# Patient Record
Sex: FEMALE | Race: WHITE | NOT HISPANIC OR LATINO | Employment: UNEMPLOYED | URBAN - METROPOLITAN AREA
[De-identification: names, ages, dates, MRNs, and addresses within clinical notes are randomized per-mention and may not be internally consistent; named-entity substitution may affect disease eponyms.]

---

## 2023-11-03 ENCOUNTER — OFFICE VISIT (OUTPATIENT)
Dept: MEDICAL GROUP | Facility: MEDICAL CENTER | Age: 33
End: 2023-11-03
Attending: FAMILY MEDICINE
Payer: MEDICAID

## 2023-11-03 VITALS
HEART RATE: 98 BPM | DIASTOLIC BLOOD PRESSURE: 78 MMHG | OXYGEN SATURATION: 98 % | WEIGHT: 199 LBS | SYSTOLIC BLOOD PRESSURE: 118 MMHG | RESPIRATION RATE: 16 BRPM | TEMPERATURE: 97.9 F | BODY MASS INDEX: 35.26 KG/M2 | HEIGHT: 63 IN

## 2023-11-03 DIAGNOSIS — F32.A ANXIETY AND DEPRESSION: ICD-10-CM

## 2023-11-03 DIAGNOSIS — F60.3 BORDERLINE PERSONALITY DISORDER (HCC): ICD-10-CM

## 2023-11-03 DIAGNOSIS — F43.10 PTSD (POST-TRAUMATIC STRESS DISORDER): ICD-10-CM

## 2023-11-03 DIAGNOSIS — F41.9 ANXIETY AND DEPRESSION: ICD-10-CM

## 2023-11-03 DIAGNOSIS — F41.0 SEVERE ANXIETY WITH PANIC: ICD-10-CM

## 2023-11-03 DIAGNOSIS — E28.2 PCOS (POLYCYSTIC OVARIAN SYNDROME): ICD-10-CM

## 2023-11-03 PROBLEM — F41.1 GAD (GENERALIZED ANXIETY DISORDER): Status: ACTIVE | Noted: 2023-11-03

## 2023-11-03 PROCEDURE — 3074F SYST BP LT 130 MM HG: CPT | Performed by: FAMILY MEDICINE

## 2023-11-03 PROCEDURE — 99204 OFFICE O/P NEW MOD 45 MIN: CPT | Performed by: FAMILY MEDICINE

## 2023-11-03 PROCEDURE — 3078F DIAST BP <80 MM HG: CPT | Performed by: FAMILY MEDICINE

## 2023-11-03 RX ORDER — SPIRONOLACTONE 100 MG/1
100 TABLET, FILM COATED ORAL DAILY
Qty: 90 TABLET | Refills: 2 | Status: SHIPPED | OUTPATIENT
Start: 2023-11-03 | End: 2023-12-26 | Stop reason: SDUPTHER

## 2023-11-03 RX ORDER — AMITRIPTYLINE HYDROCHLORIDE 100 MG/1
200 TABLET ORAL NIGHTLY
Qty: 60 TABLET | Refills: 2 | Status: SHIPPED | OUTPATIENT
Start: 2023-11-03 | End: 2023-12-26 | Stop reason: SDUPTHER

## 2023-11-03 RX ORDER — LORAZEPAM 1 MG/1
0.5 TABLET ORAL
COMMUNITY

## 2023-11-03 RX ORDER — LURASIDONE HYDROCHLORIDE 80 MG/1
TABLET, FILM COATED ORAL
Qty: 180 TABLET | Refills: 0 | Status: SHIPPED | OUTPATIENT
Start: 2023-11-03 | End: 2023-11-07 | Stop reason: SDUPTHER

## 2023-11-03 RX ORDER — PROPRANOLOL HYDROCHLORIDE 40 MG/1
40 TABLET ORAL 2 TIMES DAILY
Qty: 60 TABLET | Refills: 2 | Status: SHIPPED | OUTPATIENT
Start: 2023-11-03 | End: 2023-12-11 | Stop reason: SDUPTHER

## 2023-11-03 RX ORDER — LURASIDONE HYDROCHLORIDE 80 MG/1
80 TABLET, FILM COATED ORAL
COMMUNITY
End: 2023-11-03 | Stop reason: SDUPTHER

## 2023-11-03 RX ORDER — AMITRIPTYLINE HYDROCHLORIDE 100 MG/1
200 TABLET ORAL NIGHTLY
COMMUNITY
End: 2023-11-03 | Stop reason: SDUPTHER

## 2023-11-03 RX ORDER — SPIRONOLACTONE 100 MG/1
100 TABLET, FILM COATED ORAL DAILY
COMMUNITY
End: 2023-11-03 | Stop reason: SDUPTHER

## 2023-11-03 RX ORDER — PROPRANOLOL HYDROCHLORIDE 40 MG/1
40 TABLET ORAL 2 TIMES DAILY
COMMUNITY
End: 2023-11-03 | Stop reason: SDUPTHER

## 2023-11-03 RX ORDER — HYDROXYZINE PAMOATE 25 MG/1
25 CAPSULE ORAL 3 TIMES DAILY PRN
Qty: 90 CAPSULE | Refills: 1 | Status: SHIPPED | OUTPATIENT
Start: 2023-11-03 | End: 2023-12-26 | Stop reason: SDUPTHER

## 2023-11-03 NOTE — LETTER
[facility]  Tiny Gunn M.D.  [address]  [place] [address]  Fax: [phone]   Authorization for Release/Disclosure of   Protected Health Information   Name: JANE AGUILERA GALE : 1990 SSN: [id number]   Address: [address] [place] [id number] Phone:    There are no phone numbers on file.   I authorize the entity listed below to release/disclose the PHI below to:   [facility]/Tiny Gunn M.D. and Tiny Gunn M.D.   Provider or Entity Name:  Priyanka  [facility]     Address   [facility], [place]  [address] [facility]  Phone:      Fax:     Reason for request: continuity of care   Information to be released:    [  ] LAST COLONOSCOPY,  including any PATH REPORT and follow-up  [  ] LAST FIT/COLOGUARD RESULT [  ] LAST DEXA  [  ] LAST MAMMOGRAM  [  ] LAST PAP  [  ] LAST LABS [  ] RETINA EXAM REPORT  [  ] IMMUNIZATION RECORDS  [  ] Release all info      [  ] Check here and initial the line next to each item to release ALL health information INCLUDING  _____ Care and treatment for drug and / or alcohol abuse  _____ HIV testing, infection status, or AIDS  _____ Genetic Testing    DATES OF SERVICE OR TIME PERIOD TO BE DISCLOSED: _____________  I understand and acknowledge that:  * This Authorization may be revoked at any time by you in writing, except if your health information has already been used or disclosed.  * Your health information that will be used or disclosed as a result of you signing this authorization could be re-disclosed by the recipient. If this occurs, your re-disclosed health information may no longer be protected by State or Federal laws.  * You may refuse to sign this Authorization. Your refusal will not affect your ability to obtain treatment.  * This Authorization becomes effective upon signing and will  on (date) __________.      If no date is indicated, this Authorization will  one (1) year from the signature date.    Name: Jane AGUILERA  Ethan  Signature: Date:   11/3/2023     PLEASE FAX REQUESTED RECORDS BACK TO: [phone]

## 2023-11-03 NOTE — PROGRESS NOTES
Subjective:     CC:  No chief complaint on file.      HISTORY OF THE PRESENT ILLNESS: Patient is a 33 y.o. female. This pleasant patient is here today with her partner Isidoro, to establish primary care with me and discuss the following issues.   Her prior PCP was Priyanka Terrell DO ; last seen 7/2023  Relocated to [place] 8/2023    Specialists   None current; previously under care of Psychiatry and Therapy    She is currently without any acute complaints and here primarily for medication refill.  Her main medical conditions involve various psychiatric diagnoses as noted below.  She reports previously being under the care of psychiatry and therapy however was lost to follow-up after she relocated from the [facility] and was unable to reestablish with psychiatry and therapy.  Her medications have been Primax Khadijah manage by her former PCP in [place].  She states that she takes Ativan as needed for severe panic attacks.  Tends to take them often due to a severe fear of driving on the highway.  She also uses marijuana on a regular basis to help with mood and sleep.  She reports compliance with her other medications as noted below.    Allergies: Sulfa drugs      [id number] - [place], [place] - [address] [address] [address] [facility] [address]  Phone: [phone] Fax: [phone]      Current Outpatient Medications   Medication Sig Dispense Refill    LORazepam (ATIVAN) 1 MG Tab Take 0.5 mg by mouth 1 time a day as needed for Anxiety.      propranolol (INDERAL) 40 MG Tab Take 1 Tablet by mouth 2 times a day. 60 Tablet 2    amitriptyline (ELAVIL) 100 MG Tab Take 2 Tablets by mouth every evening. 60 Tablet 2    lurasidone (LATUDA) 80 MG Tab Take one & one-half with food (120 mg) 180 Tablet 0    Brexpiprazole 0.5 MG Tab Take 1 Tablet by mouth every evening. 30 Tablet 3    hydrOXYzine pamoate (VISTARIL) 25 MG Cap Take 1 Capsule by mouth 3 times a day as needed for Anxiety. 90 Capsule 1     "spironolactone (ALDACTONE) 100 MG Tab Take 1 Tablet by mouth every day. 90 Tablet 2     No current facility-administered medications for this visit.       No past medical history on file.    Past Surgical History:   Procedure Laterality Date    TUBAL COAGULATION LAPAROSCOPIC BILATERAL Bilateral 2019    APPENDECTOMY  2016       Social History     Tobacco Use    Smoking status: Former     Types: Cigarettes    Smokeless tobacco: Never    Tobacco comments:     Smoked ~0.5ppd x 6 months   Vaping Use    Vaping Use: Some days    Start date: 1/1/2017    Substances: THC   Substance Use Topics    Alcohol use: Not Currently     Comment: soc; 3 drinks/week; h/o heavy drinking    Drug use: Yes     Types: Marijuana     Comment: nightly; denies illicit drug or IVDU       Family History   Problem Relation Age of Onset    Heart Disease Mother     Diabetes Mother     Hypertension Mother     Lung Disease Father         possible lung cancer    Heart Disease Father     Psychiatric Illness Sister     Psychiatric Illness Brother          Objective:     /78 (BP Location: Left arm, Patient Position: Sitting, BP Cuff Size: Adult)   Pulse 98   Temp 36.6 °C (97.9 °F) (Temporal)   Resp 16   Ht 1.6 m (5' 3\")   Wt 90.3 kg (199 lb)   SpO2 98%   BMI 35.25 kg/m²   Physical Exam  Constitutional: Alert, no distress  Skin: No rashes in visible areas  Eye: Conjunctiva clear, lids normal  Respiratory: Unlabored respiratory effort, no cough, lungs clear to auscultation bilaterally  CV: RRR  MSK: Normal gait, moves all extremities  Psych: Alert and oriented x3, normal affect and mood      Assessment & Plan:   33 y.o. female with the following -    1. Anxiety and depression  -Chronic; clinically stable and generally well controlled with her current regimen.  - Encourage patient to reestablish with therapist and psychiatry to help comanage conditions.  We will provide courtesy refill for now with the following:  - propranolol (INDERAL) 40 MG " Tab; Take 1 Tablet by mouth 2 times a day.  Dispense: 60 Tablet; Refill: 2  - amitriptyline (ELAVIL) 100 MG Tab; Take 2 Tablets by mouth every evening.  Dispense: 60 Tablet; Refill: 2  - lurasidone (LATUDA) 80 MG Tab; Take one & one-half with food (120 mg)  Dispense: 180 Tablet; Refill: 0  - Brexpiprazole 0.5 MG Tab; Take 1 Tablet by mouth every evening.  Dispense: 30 Tablet; Refill: 3  - hydrOXYzine pamoate (VISTARIL) 25 MG Cap; Take 1 Capsule by mouth 3 times a day as needed for Anxiety.  Dispense: 90 Capsule; Refill: 1  -Apparent safety threat to self or others.  ER/Call/Return precautions discussed. She verbalized understanding and agreed with plan.    2. Severe anxiety with panic  -Reviewed prescribing policy for controlled substances and discussed that marijuana use is a barrier to prescribing lorazepam.  She is open to trying hydroxyzine as needed for anxiety/panic attacks.  Reviewed side effect profile.  - Referral to Behavioral Health  - Brexpiprazole 0.5 MG Tab; Take 1 Tablet by mouth every evening.  Dispense: 30 Tablet; Refill: 3    3. Borderline personality disorder (HCC)  - Referral to Behavioral Health    4. PTSD (post-traumatic stress disorder)  - Referral to Behavioral Health    5. PCOS (polycystic ovarian syndrome)  - Chronic; clinically stable. Refill sent for:  - spironolactone (ALDACTONE) 100 MG Tab; Take 1 Tablet by mouth every day.  Dispense: 90 Tablet; Refill: 2    Other orders  - LORazepam (ATIVAN) 1 MG Tab; Take 0.5 mg by mouth 1 time a day as needed for Anxiety.       Return in about 3 months (around 2/3/2024) for chronic condition follow up.    Please note that this dictation was created using voice recognition software. I have made every reasonable attempt to correct obvious errors, but I expect that there are errors of grammar and possibly content that I did not discover before finalizing the note.

## 2023-11-07 DIAGNOSIS — F32.A ANXIETY AND DEPRESSION: ICD-10-CM

## 2023-11-07 DIAGNOSIS — F41.9 ANXIETY AND DEPRESSION: ICD-10-CM

## 2023-11-07 RX ORDER — LURASIDONE HYDROCHLORIDE 80 MG/1
TABLET, FILM COATED ORAL
Qty: 180 TABLET | Refills: 0 | Status: SHIPPED | OUTPATIENT
Start: 2023-11-07 | End: 2023-12-26 | Stop reason: SDUPTHER

## 2023-11-07 NOTE — TELEPHONE ENCOUNTER
RX CLARIFICATION: NEED TO KNOW HOW MANY TIMES PER DAY.     Received request via: Pharmacy    Was the patient seen in the last year in this department? Yes    Does the patient have an active prescription (recently filled or refills available) for medication(s) requested? No    Does the patient have long-term Plus and need 100 day supply (blood pressure, diabetes and cholesterol meds only)? Patient does not have SCP

## 2023-12-11 DIAGNOSIS — F32.A ANXIETY AND DEPRESSION: ICD-10-CM

## 2023-12-11 DIAGNOSIS — F41.9 ANXIETY AND DEPRESSION: ICD-10-CM

## 2023-12-12 RX ORDER — PROPRANOLOL HYDROCHLORIDE 40 MG/1
40 TABLET ORAL 2 TIMES DAILY
Qty: 180 TABLET | Refills: 0 | Status: SHIPPED | OUTPATIENT
Start: 2023-12-12 | End: 2023-12-26 | Stop reason: SDUPTHER

## 2023-12-26 DIAGNOSIS — E28.2 PCOS (POLYCYSTIC OVARIAN SYNDROME): ICD-10-CM

## 2023-12-26 DIAGNOSIS — F32.A ANXIETY AND DEPRESSION: ICD-10-CM

## 2023-12-26 DIAGNOSIS — F41.9 ANXIETY AND DEPRESSION: ICD-10-CM

## 2023-12-26 DIAGNOSIS — F41.0 SEVERE ANXIETY WITH PANIC: ICD-10-CM

## 2023-12-27 ENCOUNTER — PATIENT MESSAGE (OUTPATIENT)
Dept: MEDICAL GROUP | Facility: MEDICAL CENTER | Age: 33
End: 2023-12-27
Payer: MEDICAID

## 2023-12-27 DIAGNOSIS — E28.2 PCOS (POLYCYSTIC OVARIAN SYNDROME): ICD-10-CM

## 2023-12-27 DIAGNOSIS — F41.0 SEVERE ANXIETY WITH PANIC: ICD-10-CM

## 2023-12-27 DIAGNOSIS — F32.A ANXIETY AND DEPRESSION: ICD-10-CM

## 2023-12-27 DIAGNOSIS — F41.9 ANXIETY AND DEPRESSION: ICD-10-CM

## 2023-12-27 RX ORDER — PROPRANOLOL HYDROCHLORIDE 40 MG/1
40 TABLET ORAL 2 TIMES DAILY
Qty: 180 TABLET | Refills: 0 | Status: SHIPPED | OUTPATIENT
Start: 2023-12-27 | End: 2023-12-27 | Stop reason: SDUPTHER

## 2023-12-27 RX ORDER — PROPRANOLOL HYDROCHLORIDE 40 MG/1
40 TABLET ORAL 2 TIMES DAILY
Qty: 180 TABLET | Refills: 0 | Status: SHIPPED | OUTPATIENT
Start: 2023-12-27

## 2023-12-27 RX ORDER — SPIRONOLACTONE 100 MG/1
100 TABLET, FILM COATED ORAL DAILY
Qty: 90 TABLET | Refills: 2 | Status: SHIPPED | OUTPATIENT
Start: 2023-12-27 | End: 2023-12-27 | Stop reason: SDUPTHER

## 2023-12-27 RX ORDER — HYDROXYZINE PAMOATE 25 MG/1
25 CAPSULE ORAL 3 TIMES DAILY PRN
Qty: 90 CAPSULE | Refills: 1 | Status: SHIPPED | OUTPATIENT
Start: 2023-12-27 | End: 2023-12-27 | Stop reason: SDUPTHER

## 2023-12-27 RX ORDER — SPIRONOLACTONE 100 MG/1
100 TABLET, FILM COATED ORAL DAILY
Qty: 90 TABLET | Refills: 2 | Status: SHIPPED | OUTPATIENT
Start: 2023-12-27

## 2023-12-27 RX ORDER — AMITRIPTYLINE HYDROCHLORIDE 100 MG/1
200 TABLET ORAL NIGHTLY
Qty: 60 TABLET | Refills: 2 | Status: SHIPPED | OUTPATIENT
Start: 2023-12-27

## 2023-12-27 RX ORDER — AMITRIPTYLINE HYDROCHLORIDE 100 MG/1
200 TABLET ORAL NIGHTLY
Qty: 60 TABLET | Refills: 2 | Status: SHIPPED | OUTPATIENT
Start: 2023-12-27 | End: 2023-12-27 | Stop reason: SDUPTHER

## 2023-12-27 RX ORDER — HYDROXYZINE PAMOATE 25 MG/1
25 CAPSULE ORAL 3 TIMES DAILY PRN
Qty: 90 CAPSULE | Refills: 1 | Status: SHIPPED | OUTPATIENT
Start: 2023-12-27

## 2023-12-27 RX ORDER — LURASIDONE HYDROCHLORIDE 80 MG/1
120 TABLET, FILM COATED ORAL DAILY
Qty: 180 TABLET | Refills: 0 | Status: SHIPPED | OUTPATIENT
Start: 2023-12-27

## 2023-12-27 RX ORDER — LURASIDONE HYDROCHLORIDE 80 MG/1
120 TABLET, FILM COATED ORAL DAILY
Qty: 180 TABLET | Refills: 0 | Status: SHIPPED | OUTPATIENT
Start: 2023-12-27 | End: 2023-12-27 | Stop reason: SDUPTHER

## 2024-02-24 SDOH — HEALTH STABILITY: PHYSICAL HEALTH: ON AVERAGE, HOW MANY DAYS PER WEEK DO YOU ENGAGE IN MODERATE TO STRENUOUS EXERCISE (LIKE A BRISK WALK)?: 0 DAYS

## 2024-02-24 SDOH — HEALTH STABILITY: MENTAL HEALTH
STRESS IS WHEN SOMEONE FEELS TENSE, NERVOUS, ANXIOUS, OR CAN'T SLEEP AT NIGHT BECAUSE THEIR MIND IS TROUBLED. HOW STRESSED ARE YOU?: VERY MUCH

## 2024-02-24 SDOH — ECONOMIC STABILITY: HOUSING INSECURITY
IN THE LAST 12 MONTHS, WAS THERE A TIME WHEN YOU DID NOT HAVE A STEADY PLACE TO SLEEP OR SLEPT IN A SHELTER (INCLUDING NOW)?: NO

## 2024-02-24 SDOH — ECONOMIC STABILITY: TRANSPORTATION INSECURITY
IN THE PAST 12 MONTHS, HAS THE LACK OF TRANSPORTATION KEPT YOU FROM MEDICAL APPOINTMENTS OR FROM GETTING MEDICATIONS?: NO

## 2024-02-24 SDOH — ECONOMIC STABILITY: FOOD INSECURITY: WITHIN THE PAST 12 MONTHS, THE FOOD YOU BOUGHT JUST DIDN'T LAST AND YOU DIDN'T HAVE MONEY TO GET MORE.: NEVER TRUE

## 2024-02-24 SDOH — ECONOMIC STABILITY: FOOD INSECURITY: WITHIN THE PAST 12 MONTHS, YOU WORRIED THAT YOUR FOOD WOULD RUN OUT BEFORE YOU GOT MONEY TO BUY MORE.: NEVER TRUE

## 2024-02-24 SDOH — ECONOMIC STABILITY: HOUSING INSECURITY: IN THE LAST 12 MONTHS, HOW MANY PLACES HAVE YOU LIVED?: 2

## 2024-02-24 SDOH — ECONOMIC STABILITY: INCOME INSECURITY: IN THE LAST 12 MONTHS, WAS THERE A TIME WHEN YOU WERE NOT ABLE TO PAY THE MORTGAGE OR RENT ON TIME?: NO

## 2024-02-24 SDOH — ECONOMIC STABILITY: TRANSPORTATION INSECURITY
IN THE PAST 12 MONTHS, HAS LACK OF RELIABLE TRANSPORTATION KEPT YOU FROM MEDICAL APPOINTMENTS, MEETINGS, WORK OR FROM GETTING THINGS NEEDED FOR DAILY LIVING?: NO

## 2024-02-24 SDOH — HEALTH STABILITY: PHYSICAL HEALTH: ON AVERAGE, HOW MANY MINUTES DO YOU ENGAGE IN EXERCISE AT THIS LEVEL?: 0 MIN

## 2024-02-24 SDOH — ECONOMIC STABILITY: TRANSPORTATION INSECURITY
IN THE PAST 12 MONTHS, HAS LACK OF TRANSPORTATION KEPT YOU FROM MEETINGS, WORK, OR FROM GETTING THINGS NEEDED FOR DAILY LIVING?: NO

## 2024-02-24 ASSESSMENT — SOCIAL DETERMINANTS OF HEALTH (SDOH)
DO YOU BELONG TO ANY CLUBS OR ORGANIZATIONS SUCH AS CHURCH GROUPS UNIONS, FRATERNAL OR ATHLETIC GROUPS, OR SCHOOL GROUPS?: NO
HOW OFTEN DO YOU HAVE A DRINK CONTAINING ALCOHOL: NEVER
HOW OFTEN DO YOU GET TOGETHER WITH FRIENDS OR RELATIVES?: ONCE A WEEK
IN A TYPICAL WEEK, HOW MANY TIMES DO YOU TALK ON THE PHONE WITH FAMILY, FRIENDS, OR NEIGHBORS?: MORE THAN THREE TIMES A WEEK
ARE YOU MARRIED, WIDOWED, DIVORCED, SEPARATED, NEVER MARRIED, OR LIVING WITH A PARTNER?: LIVING WITH PARTNER
HOW OFTEN DO YOU HAVE SIX OR MORE DRINKS ON ONE OCCASION: NEVER
ARE YOU MARRIED, WIDOWED, DIVORCED, SEPARATED, NEVER MARRIED, OR LIVING WITH A PARTNER?: LIVING WITH PARTNER
IN A TYPICAL WEEK, HOW MANY TIMES DO YOU TALK ON THE PHONE WITH FAMILY, FRIENDS, OR NEIGHBORS?: MORE THAN THREE TIMES A WEEK
HOW OFTEN DO YOU ATTENT MEETINGS OF THE CLUB OR ORGANIZATION YOU BELONG TO?: NEVER
HOW OFTEN DO YOU ATTENT MEETINGS OF THE CLUB OR ORGANIZATION YOU BELONG TO?: NEVER
HOW MANY DRINKS CONTAINING ALCOHOL DO YOU HAVE ON A TYPICAL DAY WHEN YOU ARE DRINKING: PATIENT DOES NOT DRINK
DO YOU BELONG TO ANY CLUBS OR ORGANIZATIONS SUCH AS CHURCH GROUPS UNIONS, FRATERNAL OR ATHLETIC GROUPS, OR SCHOOL GROUPS?: NO
WITHIN THE PAST 12 MONTHS, YOU WORRIED THAT YOUR FOOD WOULD RUN OUT BEFORE YOU GOT THE MONEY TO BUY MORE: NEVER TRUE
HOW OFTEN DO YOU ATTEND CHURCH OR RELIGIOUS SERVICES?: NEVER
HOW OFTEN DO YOU ATTEND CHURCH OR RELIGIOUS SERVICES?: NEVER
HOW OFTEN DO YOU GET TOGETHER WITH FRIENDS OR RELATIVES?: ONCE A WEEK

## 2024-02-24 ASSESSMENT — LIFESTYLE VARIABLES
HOW OFTEN DO YOU HAVE SIX OR MORE DRINKS ON ONE OCCASION: NEVER
AUDIT-C TOTAL SCORE: 0
HOW MANY STANDARD DRINKS CONTAINING ALCOHOL DO YOU HAVE ON A TYPICAL DAY: PATIENT DOES NOT DRINK
SKIP TO QUESTIONS 9-10: 1
HOW OFTEN DO YOU HAVE A DRINK CONTAINING ALCOHOL: NEVER

## 2024-02-27 ENCOUNTER — OFFICE VISIT (OUTPATIENT)
Dept: MEDICAL GROUP | Facility: MEDICAL CENTER | Age: 34
End: 2024-02-27
Attending: FAMILY MEDICINE
Payer: MEDICAID

## 2024-02-27 ENCOUNTER — PATIENT MESSAGE (OUTPATIENT)
Dept: MEDICAL GROUP | Facility: MEDICAL CENTER | Age: 34
End: 2024-02-27

## 2024-02-27 ENCOUNTER — HOSPITAL ENCOUNTER (OUTPATIENT)
Facility: MEDICAL CENTER | Age: 34
End: 2024-02-27
Attending: FAMILY MEDICINE
Payer: MEDICAID

## 2024-02-27 VITALS
HEIGHT: 63 IN | DIASTOLIC BLOOD PRESSURE: 64 MMHG | HEART RATE: 88 BPM | SYSTOLIC BLOOD PRESSURE: 112 MMHG | RESPIRATION RATE: 16 BRPM | WEIGHT: 203.8 LBS | TEMPERATURE: 97 F | OXYGEN SATURATION: 97 % | BODY MASS INDEX: 36.11 KG/M2

## 2024-02-27 DIAGNOSIS — R21 RASH AND NONSPECIFIC SKIN ERUPTION: ICD-10-CM

## 2024-02-27 DIAGNOSIS — Z01.419 WELL WOMAN EXAM WITH ROUTINE GYNECOLOGICAL EXAM: ICD-10-CM

## 2024-02-27 DIAGNOSIS — Z12.4 CERVICAL CANCER SCREENING: ICD-10-CM

## 2024-02-27 DIAGNOSIS — Z11.3 SCREEN FOR STD (SEXUALLY TRANSMITTED DISEASE): ICD-10-CM

## 2024-02-27 PROCEDURE — 99459 PELVIC EXAMINATION: CPT | Performed by: FAMILY MEDICINE

## 2024-02-27 PROCEDURE — 87491 CHLMYD TRACH DNA AMP PROBE: CPT

## 2024-02-27 PROCEDURE — 90471 IMMUNIZATION ADMIN: CPT

## 2024-02-27 PROCEDURE — 87480 CANDIDA DNA DIR PROBE: CPT

## 2024-02-27 PROCEDURE — 87660 TRICHOMONAS VAGIN DIR PROBE: CPT

## 2024-02-27 PROCEDURE — 87591 N.GONORRHOEAE DNA AMP PROB: CPT

## 2024-02-27 PROCEDURE — 3074F SYST BP LT 130 MM HG: CPT | Performed by: FAMILY MEDICINE

## 2024-02-27 PROCEDURE — 87510 GARDNER VAG DNA DIR PROBE: CPT

## 2024-02-27 PROCEDURE — 87624 HPV HI-RISK TYP POOLED RSLT: CPT

## 2024-02-27 PROCEDURE — G0438 PPPS, INITIAL VISIT: HCPCS | Mod: 25 | Performed by: FAMILY MEDICINE

## 2024-02-27 PROCEDURE — 3078F DIAST BP <80 MM HG: CPT | Performed by: FAMILY MEDICINE

## 2024-02-27 PROCEDURE — 88175 CYTOPATH C/V AUTO FLUID REDO: CPT

## 2024-02-27 PROCEDURE — 99213 OFFICE O/P EST LOW 20 MIN: CPT | Mod: XU | Performed by: FAMILY MEDICINE

## 2024-02-27 RX ORDER — TRIAMCINOLONE ACETONIDE 1 MG/G
1 CREAM TOPICAL 2 TIMES DAILY
Qty: 80 G | Refills: 1 | Status: SHIPPED | OUTPATIENT
Start: 2024-02-27 | End: 2024-02-27 | Stop reason: SDUPTHER

## 2024-02-27 RX ORDER — TRIAMCINOLONE ACETONIDE 1 MG/G
1 CREAM TOPICAL 2 TIMES DAILY
Qty: 80 G | Refills: 1 | Status: SHIPPED | OUTPATIENT
Start: 2024-02-27 | End: 2024-02-28 | Stop reason: SDUPTHER

## 2024-02-27 NOTE — PROGRESS NOTES
"Subjective:     CC:   Chief Complaint   Patient presents with    Gynecologic Exam     Pap smear, yeast infection in arm pit,    Unless otherwise indicated, the social history and sensitive portions of the HPI were completed with patient confidentially and without any other persons in the room.      HPI:   Ana Maria Long is a 33 y.o. female who presents for annual exam with her partner Isidoro. Patient is feeling well and denies any complaints.    Ob-Gyn/ History:    Patient has GYN provider: no  /Para:    Last Pap Smear:  . One history of abnormal pap smear, repeat normal  Gyn Surgery:  BTL 2019.  Current Contraceptive Method:  surgical. She is currently sexually active.  Last menstrual period: 24.  Periods irregular since BTL. moderate bleeding. Cramping is no.   Patient does take OTC analgesics for cramps.  No significant bloating/fluid retention, pelvic pain, or dyspareunia. No abnormalvaginal discharge  Folate intake: no  No plans for pregnancy within the next year    Health Maintenance  Cholesterol Screening: Ordered 24   Diabetes Screening: Ordered 24     Anticipatory Guidance  Diet: Admits to eating a lot of \"junk food\"/poor diet. Limited fruit and vegetable intake.  Exercise: No regular exercise.   Substance Abuse: Quit alcohol last month; uses marijuana nightly for sleep  Safe in relationship.   Seat belts, helmet, gun, sun safety discussed.    Cancer screening  Cervical Cancer Screening: Performed 24     Infectious disease screening/Immunizations  --STI Screening: Ordered 24   --  Multiple partners; has STD testing regularly. Has male and female partners.  --HIV Screening: Ordered 24   --Hepatitis C Screening: Ordered 24   --Immunizations:    Influenza: utd   HPV: n/a   Tetanus: due   Shingles: n/a    Pneumococcal : n/a     Hepatitis B: due  COVID-19: utd    She  has a past medical history of Anxiety and Depression.  She  has a past surgical history that " includes tubal coagulation laparoscopic bilateral (Bilateral, 2019) and appendectomy (2016).    Family History   Problem Relation Age of Onset    Heart Disease Mother     Diabetes Mother     Hypertension Mother     Lung Disease Father         possible lung cancer    Heart Disease Father     Psychiatric Illness Sister     Psychiatric Illness Brother        Social History     Socioeconomic History    Marital status: Legally      Spouse name: Not on file    Number of children: 0    Years of education: Not on file    Highest education level: Associate degree: academic program   Occupational History     Comment: Unemployed   Tobacco Use    Smoking status: Former     Current packs/day: 0.00     Types: Cigarettes    Smokeless tobacco: Never    Tobacco comments:     Smoked ~0.5ppd x 6 months   Vaping Use    Vaping Use: Some days    Start date: 1/1/2017    Substances: THC   Substance and Sexual Activity    Alcohol use: Not Currently     Comment: fomer; 3 drinks/week; h/o heavy drinking. Last drink 1/2024    Drug use: Yes     Types: Marijuana     Comment: Use nightly before bed.    Sexual activity: Yes     Partners: Male     Birth control/protection: Condom, Surgical, Female Sterilization   Other Topics Concern    Not on file   Social History Narrative    Lives with partner Isidoro, and his 2 daughters (17 and 13)     Social Determinants of Health     Financial Resource Strain: Not on file   Food Insecurity: No Food Insecurity (2/24/2024)    Hunger Vital Sign     Worried About Running Out of Food in the Last Year: Never true     Ran Out of Food in the Last Year: Never true   Transportation Needs: No Transportation Needs (2/24/2024)    PRAPARE - Transportation     Lack of Transportation (Medical): No     Lack of Transportation (Non-Medical): No   Physical Activity: Inactive (2/24/2024)    Exercise Vital Sign     Days of Exercise per Week: 0 days     Minutes of Exercise per Session: 0 min   Stress: Stress Concern Present  (2/24/2024)    [employer] [place] [employer] - Occupational Stress Questionnaire     Feeling of Stress : Very much   Social Connections: Moderately Isolated (2/24/2024)    Social Connection and Isolation Panel [NHANES]     Frequency of Communication with Friends and Family: More than three times a week     Frequency of Social Gatherings with Friends and Family: Once a week     Attends Orthodoxy Services: Never     Active Member of Clubs or Organizations: No     Attends Club or Organization Meetings: Never     Marital Status: Living with partner   Intimate Partner Violence: Not on file   Housing Stability: Low Risk  (2/24/2024)    Housing Stability Vital Sign     Unable to Pay for Housing in the Last Year: No     Number of Places Lived in the Last Year: 2     Unstable Housing in the Last Year: No       Patient Active Problem List    Diagnosis Date Noted    Borderline personality disorder (HCC) 11/03/2023    PTSD (post-traumatic stress disorder) 11/03/2023    Anxiety and depression 11/03/2023    PCOS (polycystic ovarian syndrome) 11/03/2023    Adjustment disorder with depressed mood 04/10/2008    Dysmenorrhea 02/13/2008         Current Outpatient Medications   Medication Sig Dispense Refill    triamcinolone acetonide (KENALOG) 0.1 % Cream Apply 1 Application topically 2 times a day. 80 g 1    amitriptyline (ELAVIL) 100 MG Tab Take 2 Tablets by mouth every evening. 60 Tablet 2    Brexpiprazole 0.5 MG Tab Take 1 Tablet by mouth every evening. 30 Tablet 3    hydrOXYzine pamoate (VISTARIL) 25 MG Cap Take 1 Capsule by mouth 3 times a day as needed for Anxiety. 90 Capsule 1    lurasidone (LATUDA) 80 MG Tab Take 1.5 Tablets by mouth every day with food. 180 Tablet 0    propranolol (INDERAL) 40 MG Tab Take 1 Tablet by mouth 2 times a day. 180 Tablet 0    spironolactone (ALDACTONE) 100 MG Tab Take 1 Tablet by mouth every day. 90 Tablet 2    LORazepam (ATIVAN) 1 MG Tab Take 0.5 mg by mouth 1 time a day as needed for  "Anxiety.       No current facility-administered medications for this visit.     Allergies   Allergen Reactions    Sulfa Drugs Anaphylaxis     rash     Review of Systems   Constitutional: Negative for fever, chills and malaise/fatigue.   HENT: Negative for congestion.    Eyes: Negative for pain.    Respiratory: Negative for cough and shortness of breath.  Cardiovascular: Negative for leg swelling.   Gastrointestinal: Negative for nausea, vomiting, abdominal pain and diarrhea.   Genitourinary: Negative for dysuria and hematuria.   Skin: Negative for rash.   Neurological: Negative for dizziness, focal weakness and headaches.   Endo/Heme/Allergies: Does not bleed easily.   Psychiatric/Behavioral: Negative for depression.  The patient is not nervous/anxious.      Objective:     /64   Pulse 88   Temp 36.1 °C (97 °F)   Resp 16   Ht 1.6 m (5' 2.99\")   Wt 92.4 kg (203 lb 12.8 oz)   SpO2 97%   BMI 36.11 kg/m²   Body mass index is 36.11 kg/m².  Wt Readings from Last 4 Encounters:   02/27/24 92.4 kg (203 lb 12.8 oz)   11/03/23 90.3 kg (199 lb)       Physical Exam:  Constitutional: Well-developed and well-nourished. Not diaphoretic. No distress.   Skin: Skin is warm and dry. Hyperpigmented scaly rash in bilateral axilla  Head: Atraumatic without lesions.  Eyes: Conjunctivae and extraocular motions are normal. Pupils are equal, round, and reactive to light. No scleral icterus.   Ears:  External ears unremarkable. Tympanic membranes clear and intact.  Nose: Nares patent.  No discharge.   Mouth/Throat: Dentition is normal. Tongue normal. Oropharynx is clear and moist. Posterior pharynx without erythema or exudates.  Neck: Supple, trachea midline. Normal range of motion. No thyromegaly present. No lymphadenopathy--cervical or supraclavicular.  Cardiovascular: Regular rate and rhythm, no murmur, rubs, or gallops.  Lungs: Normal inspiratory effort on room air, CTA bilaterally, no wheezes/rhonchi/rales  Breast: Breasts " examined seated and supine. No skin changes, peau d'orange or nipple retraction. No discharge. No axillary or supraclavicular adenopathy. No masses or nodularity palpable.   Abdomen: Soft, non tender, and without distention. Active bowel sounds in all four quadrants. No rebound, guarding, masses or HSM.  :Perineum and external genitalia normal without rash. Vagina with scant white discharge. Cervix without visible lesions or discharge. Bimanual exam without adnexal masses or cervical motion tenderness.  Extremities: No cyanosis, clubbing, erythema, nor edema. Distal pulses intact and symmetric.   Musculoskeletal: All major joints AROM full in all directions without pain.  Neurological: Alert and oriented x 3. DTRs 2+/3 and symmetric. No cranial nerve deficit. 5/5 myotomes. Sensation intact.   Psychiatric:  Behavior, mood, and affect are appropriate.    A chaperone was offered to the patient during today's exam. Patient declined chaperone.    Assessment and Plan:     1. Well woman exam with routine gynecological exam  Preventative Medicine / HCM visit with no emergent concerns.  - Recommended yearly HCM visits  - Discussed importance of healthy diet and exercise (5x/week, 20-30 minutes of sustained cardiovascular training)  -Advised that pt should take 400 - 800 mcg of folic acid per day.  -Advised on maintaining routine vaccinations  -Will contact with abnormal lab results.  -Advised pt to wear helmets. seatbelt and sunscreen as discussed; continue safer sex practices  - Anticipatory guidance including:  Exercise:   - Try for at least 150 minutes of cardiovascular (strenuous) exercise per week.   Safety:   - Wear your seat belt at all times when in a car and NO TEXTING/CELL PHONES while driving.   - Wear a helmet while biking, using a motorcycle, skiing/snow boarding, skate/long boarding, or any activities faster than running.   - Avoid smoking.   - If you have a gun it needs to be locked up and stored unloaded  "away from children.   Nutrition:   - Drink enough water so that urine is light yellow/clear  - Try to avoid alcoholic drinks; or consume no more than 2 alcoholic drinks per day for men. No more than 1 alcoholic drink per day for woman.   - Read labels for calories   - Use website \"My Fitness Pal\" for free calorie counting tool when possible.   Stress:   - Make time for activities that allow you to decrease stress and recognize any red flags of stress for you to know when to activate your stress release mechanisms.   Sleep:   - Try to get 7-9 hours of sleep each night.   Preventative Care:   - Follow-up yearly for your annual exams   - Pap smears start at age 21 repeat every 3-5 years depending on age and results until age 64  - Colon cancer screening starting at age 45 until age 75  - Mammograms for breast cancer screening every 1-2 years can start at age 40; general recommendation is biennial screening mammography for women aged 50 to 74 years  - Annual flu vaccination   - Bone density screening at age 65  - Pneumonia vaccination at age 65   - Shingles vaccination at age 50  - VAGINAL PATHOGENS DNA PANEL    2. Cervical cancer screening  - THINPREP PAP W/HPV AND CTNG    3. Screen for STD (sexually transmitted disease)  - Routine screen; encourage continued safer sex practices including condom use 100%  - HIV AG/AB COMBO ASSAY SCREENING; Future  - HEMOGLOBIN A1C; Future  - Lipid Profile; Future  - HEP C VIRUS ANTIBODY; Future  - Comp Metabolic Panel; Future  - CBC WITH DIFFERENTIAL; Future  - Tdap =>6yo IM  - T.Pallidum AB MICHELLE (Screening); Future  - HEP B Surface Antibody; Future  - Hep B Core AB Total; Future  - Hep B Surface Antigen; Future    4. Rash and nonspecific skin eruption  - Chronic; new issue to provider noted on full PE performed today. Patient reports use of OTC antifungal and benzoyl peroxide wash with no significant improvement. Discussed possible dermatitis with subsequent post-inflammatory " hyperpigmentation. Can do trial of topical steroids after discussion of r/b/a.      Follow-up: Return in about 1 year (around 2/27/2025), or if symptoms worsen or fail to improve, for routine annual wellness exam.

## 2024-02-28 DIAGNOSIS — B37.31 VAGINAL YEAST INFECTION: ICD-10-CM

## 2024-02-28 DIAGNOSIS — R21 RASH AND NONSPECIFIC SKIN ERUPTION: ICD-10-CM

## 2024-02-28 LAB
CANDIDA DNA VAG QL PROBE+SIG AMP: POSITIVE
G VAGINALIS DNA VAG QL PROBE+SIG AMP: NEGATIVE
T VAGINALIS DNA VAG QL PROBE+SIG AMP: NEGATIVE

## 2024-02-28 RX ORDER — FLUCONAZOLE 150 MG/1
TABLET ORAL
Qty: 2 TABLET | Refills: 0 | Status: SHIPPED | OUTPATIENT
Start: 2024-02-28

## 2024-02-28 RX ORDER — TRIAMCINOLONE ACETONIDE 1 MG/G
1 CREAM TOPICAL 2 TIMES DAILY
Qty: 80 G | Refills: 1 | Status: SHIPPED | OUTPATIENT
Start: 2024-02-28

## 2024-02-28 NOTE — TELEPHONE ENCOUNTER
Received request via: Patient    Was the patient seen in the last year in this department? Yes    Does the patient have an active prescription (recently filled or refills available) for medication(s) requested?  Yes, sent to wrong pharmacy    [facility]arm[facility]: [facility]    Does the patient have alf Plus and need 100 day supply (blood pressure, diabetes and cholesterol meds only)? Patient does not have SCP

## 2024-03-04 LAB
C TRACH RRNA CVX QL NAA+PROBE: NEGATIVE
CYTOLOGIST CVX/VAG CYTO: NORMAL
CYTOLOGY CVX/VAG DOC CYTO: NORMAL
CYTOLOGY CVX/VAG DOC THIN PREP: NORMAL
HPV I/H RISK 4 DNA CVX QL PROBE+SIG AMP: NEGATIVE
N GONORRHOEA RRNA CVX QL NAA+PROBE: NEGATIVE
NOTE NL11727A: NORMAL
OTHER STN SPEC: NORMAL
STAT OF ADQ CVX/VAG CYTO-IMP: NORMAL

## 2024-03-07 ENCOUNTER — HOSPITAL ENCOUNTER (OUTPATIENT)
Dept: LAB | Facility: MEDICAL CENTER | Age: 34
End: 2024-03-07
Attending: FAMILY MEDICINE
Payer: MEDICAID

## 2024-03-07 DIAGNOSIS — Z11.3 SCREEN FOR STD (SEXUALLY TRANSMITTED DISEASE): ICD-10-CM

## 2024-03-07 LAB
ALBUMIN SERPL BCP-MCNC: 4.4 G/DL (ref 3.2–4.9)
ALBUMIN/GLOB SERPL: 1.6 G/DL
ALP SERPL-CCNC: 83 U/L (ref 30–99)
ALT SERPL-CCNC: 15 U/L (ref 2–50)
ANION GAP SERPL CALC-SCNC: 11 MMOL/L (ref 7–16)
AST SERPL-CCNC: 16 U/L (ref 12–45)
BASOPHILS # BLD AUTO: 0.7 % (ref 0–1.8)
BASOPHILS # BLD: 0.05 K/UL (ref 0–0.12)
BILIRUB SERPL-MCNC: 0.2 MG/DL (ref 0.1–1.5)
BUN SERPL-MCNC: 12 MG/DL (ref 8–22)
CALCIUM ALBUM COR SERPL-MCNC: 9 MG/DL (ref 8.5–10.5)
CALCIUM SERPL-MCNC: 9.3 MG/DL (ref 8.5–10.5)
CHLORIDE SERPL-SCNC: 106 MMOL/L (ref 96–112)
CHOLEST SERPL-MCNC: 191 MG/DL (ref 100–199)
CO2 SERPL-SCNC: 25 MMOL/L (ref 20–33)
CREAT SERPL-MCNC: 0.76 MG/DL (ref 0.5–1.4)
EOSINOPHIL # BLD AUTO: 0.2 K/UL (ref 0–0.51)
EOSINOPHIL NFR BLD: 2.9 % (ref 0–6.9)
ERYTHROCYTE [DISTWIDTH] IN BLOOD BY AUTOMATED COUNT: 45 FL (ref 35.9–50)
EST. AVERAGE GLUCOSE BLD GHB EST-MCNC: 94 MG/DL
FASTING STATUS PATIENT QL REPORTED: NORMAL
GFR SERPLBLD CREATININE-BSD FMLA CKD-EPI: 106 ML/MIN/1.73 M 2
GLOBULIN SER CALC-MCNC: 2.8 G/DL (ref 1.9–3.5)
GLUCOSE SERPL-MCNC: 85 MG/DL (ref 65–99)
HBA1C MFR BLD: 4.9 % (ref 4–5.6)
HBV CORE AB SERPL QL IA: NONREACTIVE
HBV SURFACE AB SERPL IA-ACNC: 4411 MIU/ML (ref 0–10)
HBV SURFACE AG SER QL: NORMAL
HCT VFR BLD AUTO: 40.2 % (ref 37–47)
HCV AB SER QL: NORMAL
HDLC SERPL-MCNC: 56 MG/DL
HGB BLD-MCNC: 13.3 G/DL (ref 12–16)
HIV 1+2 AB+HIV1 P24 AG SERPL QL IA: NORMAL
IMM GRANULOCYTES # BLD AUTO: 0.02 K/UL (ref 0–0.11)
IMM GRANULOCYTES NFR BLD AUTO: 0.3 % (ref 0–0.9)
LDLC SERPL CALC-MCNC: 125 MG/DL
LYMPHOCYTES # BLD AUTO: 1.54 K/UL (ref 1–4.8)
LYMPHOCYTES NFR BLD: 22 % (ref 22–41)
MCH RBC QN AUTO: 30.9 PG (ref 27–33)
MCHC RBC AUTO-ENTMCNC: 33.1 G/DL (ref 32.2–35.5)
MCV RBC AUTO: 93.3 FL (ref 81.4–97.8)
MONOCYTES # BLD AUTO: 0.38 K/UL (ref 0–0.85)
MONOCYTES NFR BLD AUTO: 5.4 % (ref 0–13.4)
NEUTROPHILS # BLD AUTO: 4.8 K/UL (ref 1.82–7.42)
NEUTROPHILS NFR BLD: 68.7 % (ref 44–72)
NRBC # BLD AUTO: 0 K/UL
NRBC BLD-RTO: 0 /100 WBC (ref 0–0.2)
PLATELET # BLD AUTO: 373 K/UL (ref 164–446)
PMV BLD AUTO: 9.1 FL (ref 9–12.9)
POTASSIUM SERPL-SCNC: 4.7 MMOL/L (ref 3.6–5.5)
PROT SERPL-MCNC: 7.2 G/DL (ref 6–8.2)
RBC # BLD AUTO: 4.31 M/UL (ref 4.2–5.4)
SODIUM SERPL-SCNC: 142 MMOL/L (ref 135–145)
T PALLIDUM AB SER QL IA: NORMAL
TRIGL SERPL-MCNC: 50 MG/DL (ref 0–149)
WBC # BLD AUTO: 7 K/UL (ref 4.8–10.8)

## 2024-03-07 PROCEDURE — 87340 HEPATITIS B SURFACE AG IA: CPT

## 2024-03-07 PROCEDURE — 86780 TREPONEMA PALLIDUM: CPT

## 2024-03-07 PROCEDURE — 36415 COLL VENOUS BLD VENIPUNCTURE: CPT

## 2024-03-07 PROCEDURE — 86704 HEP B CORE ANTIBODY TOTAL: CPT

## 2024-03-07 PROCEDURE — 80053 COMPREHEN METABOLIC PANEL: CPT

## 2024-03-07 PROCEDURE — 85025 COMPLETE CBC W/AUTO DIFF WBC: CPT

## 2024-03-07 PROCEDURE — 83036 HEMOGLOBIN GLYCOSYLATED A1C: CPT

## 2024-03-07 PROCEDURE — 86706 HEP B SURFACE ANTIBODY: CPT

## 2024-03-07 PROCEDURE — 86803 HEPATITIS C AB TEST: CPT

## 2024-03-07 PROCEDURE — 87389 HIV-1 AG W/HIV-1&-2 AB AG IA: CPT

## 2024-03-07 PROCEDURE — 80061 LIPID PANEL: CPT

## 2024-04-17 DIAGNOSIS — F41.9 ANXIETY AND DEPRESSION: ICD-10-CM

## 2024-04-17 DIAGNOSIS — F32.A ANXIETY AND DEPRESSION: ICD-10-CM

## 2024-04-17 RX ORDER — LURASIDONE HYDROCHLORIDE 80 MG/1
120 TABLET, FILM COATED ORAL DAILY
Qty: 180 TABLET | Refills: 0 | Status: SHIPPED | OUTPATIENT
Start: 2024-04-17

## 2024-05-13 DIAGNOSIS — F41.9 ANXIETY AND DEPRESSION: ICD-10-CM

## 2024-05-13 DIAGNOSIS — F32.A ANXIETY AND DEPRESSION: ICD-10-CM

## 2024-05-13 RX ORDER — HYDROXYZINE PAMOATE 25 MG/1
CAPSULE ORAL
Qty: 90 CAPSULE | Refills: 1 | Status: SHIPPED | OUTPATIENT
Start: 2024-05-13

## 2024-05-13 NOTE — TELEPHONE ENCOUNTER
Received request via: Pharmacy    Was the patient seen in the last year in this department? Yes    Does the patient have an active prescription (recently filled or refills available) for medication(s) requested? No    Pharmacy Name: [facility]    Does the patient have snf Plus and need 100 day supply (blood pressure, diabetes and cholesterol meds only)? Patient does not have SCP

## 2024-07-14 DIAGNOSIS — F41.9 ANXIETY AND DEPRESSION: ICD-10-CM

## 2024-07-14 DIAGNOSIS — F32.A ANXIETY AND DEPRESSION: ICD-10-CM

## 2024-07-15 RX ORDER — PROPRANOLOL HYDROCHLORIDE 40 MG/1
40 TABLET ORAL 2 TIMES DAILY
Qty: 180 TABLET | Refills: 2 | Status: SHIPPED | OUTPATIENT
Start: 2024-07-15

## 2024-07-19 DIAGNOSIS — F32.A ANXIETY AND DEPRESSION: ICD-10-CM

## 2024-07-19 DIAGNOSIS — F41.9 ANXIETY AND DEPRESSION: ICD-10-CM

## 2024-07-22 RX ORDER — HYDROXYZINE PAMOATE 25 MG/1
25 CAPSULE ORAL 3 TIMES DAILY PRN
Qty: 90 CAPSULE | Refills: 1 | Status: SHIPPED | OUTPATIENT
Start: 2024-07-22

## 2024-09-23 DIAGNOSIS — F41.9 ANXIETY AND DEPRESSION: ICD-10-CM

## 2024-09-23 DIAGNOSIS — F32.A ANXIETY AND DEPRESSION: ICD-10-CM

## 2024-09-24 RX ORDER — HYDROXYZINE PAMOATE 25 MG/1
25 CAPSULE ORAL 3 TIMES DAILY PRN
Qty: 90 CAPSULE | Refills: 1 | Status: SHIPPED | OUTPATIENT
Start: 2024-09-24

## 2024-09-24 NOTE — TELEPHONE ENCOUNTER
Received request via: Pharmacy    Was the patient seen in the last year in this department? Yes    Does the patient have an active prescription (recently filled or refills available) for medication(s) requested? No    Pharmacy Name: [facility] [place]    Does the patient have long-term Plus and need 100-day supply? (This applies to ALL medications) Patient does not have SCP

## 2024-09-27 DIAGNOSIS — F41.9 ANXIETY AND DEPRESSION: ICD-10-CM

## 2024-09-27 DIAGNOSIS — F32.A ANXIETY AND DEPRESSION: ICD-10-CM

## 2024-09-27 RX ORDER — AMITRIPTYLINE HYDROCHLORIDE 100 MG/1
200 TABLET ORAL EVERY EVENING
Qty: 60 TABLET | Refills: 2 | Status: SHIPPED | OUTPATIENT
Start: 2024-09-27

## 2024-09-27 NOTE — TELEPHONE ENCOUNTER
Received request via: Pharmacy    Was the patient seen in the last year in this department? Yes    Does the patient have an active prescription (recently filled or refills available) for medication(s) requested? No    Pharmacy Name: [facility]    Does the patient have senior living Plus and need 100-day supply? (This applies to ALL medications) Patient does not have SCP

## 2024-11-10 DIAGNOSIS — E28.2 PCOS (POLYCYSTIC OVARIAN SYNDROME): ICD-10-CM

## 2024-11-12 RX ORDER — SPIRONOLACTONE 100 MG/1
100 TABLET, FILM COATED ORAL DAILY
Qty: 90 TABLET | Refills: 2 | Status: SHIPPED | OUTPATIENT
Start: 2024-11-12

## 2024-11-12 NOTE — TELEPHONE ENCOUNTER
Received request via: Pharmacy    Was the patient seen in the last year in this department? Yes    Does the patient have an active prescription (recently filled or refills available) for medication(s) requested? No    [facility]    Does the patient have [facility] Plus and need 100-day supply? (This applies to ALL medications) Patient does not have SCP

## 2024-11-17 DIAGNOSIS — F32.A ANXIETY AND DEPRESSION: ICD-10-CM

## 2024-11-17 DIAGNOSIS — F41.9 ANXIETY AND DEPRESSION: ICD-10-CM

## 2024-11-20 RX ORDER — HYDROXYZINE PAMOATE 25 MG/1
25 CAPSULE ORAL 3 TIMES DAILY PRN
Qty: 90 CAPSULE | Refills: 1 | Status: SHIPPED | OUTPATIENT
Start: 2024-11-20

## 2024-11-20 NOTE — TELEPHONE ENCOUNTER
Received request via: Pharmacy    Was the patient seen in the last year in this department? Yes    Does the patient have an active prescription (recently filled or refills available) for medication(s) requested? No    Pharmacy Name: [facility]    Does the patient have residential Plus and need 100-day supply? (This applies to ALL medications) Patient does not have SCP

## 2024-12-15 ENCOUNTER — OFFICE VISIT (OUTPATIENT)
Dept: URGENT CARE | Facility: CLINIC | Age: 34
End: 2024-12-15
Payer: MEDICAID

## 2024-12-15 VITALS
HEART RATE: 95 BPM | HEIGHT: 63 IN | TEMPERATURE: 98.6 F | OXYGEN SATURATION: 96 % | DIASTOLIC BLOOD PRESSURE: 78 MMHG | RESPIRATION RATE: 18 BRPM | BODY MASS INDEX: 38.27 KG/M2 | WEIGHT: 216 LBS | SYSTOLIC BLOOD PRESSURE: 112 MMHG

## 2024-12-15 DIAGNOSIS — S83.412A SPRAIN OF MEDIAL COLLATERAL LIGAMENT OF LEFT KNEE, INITIAL ENCOUNTER: ICD-10-CM

## 2024-12-15 PROCEDURE — 99213 OFFICE O/P EST LOW 20 MIN: CPT | Performed by: PHYSICIAN ASSISTANT

## 2024-12-15 ASSESSMENT — FIBROSIS 4 INDEX: FIB4 SCORE: 0.38

## 2024-12-15 NOTE — PROGRESS NOTES
Subjective:   Ana Maria Long is a 34 y.o. female who presents today with   Chief Complaint   Patient presents with    Knee Pain     Feeling like it wants to pop       Knee Pain  This is a new problem. Episode onset: 3 days. The problem occurs constantly. The problem has been unchanged. The symptoms are aggravated by twisting and walking. She has tried NSAIDs for the symptoms. The treatment provided mild relief.     No recent specific injury or trauma.    PMH:  has a past medical history of Anxiety and Depression.  MEDS:   Current Outpatient Medications:     hydrOXYzine pamoate (VISTARIL) 25 MG Cap, TAKE 1 CAPSULE BY MOUTH 3 TIMES A DAY AS NEEDED FOR ANXIETY, Disp: 90 Capsule, Rfl: 1    spironolactone (ALDACTONE) 100 MG Tab, TAKE 1 TABLET BY MOUTH DAILY, Disp: 90 Tablet, Rfl: 2    amitriptyline (ELAVIL) 100 MG Tab, TAKE TWO TABLETS BY MOUTH EVERY EVENING, Disp: 60 Tablet, Rfl: 2    propranolol (INDERAL) 40 MG Tab, Take 1 Tablet by mouth 2 times a day., Disp: 180 Tablet, Rfl: 2    lurasidone (LATUDA) 80 MG Tab, Take 1.5 Tablets by mouth every day with food., Disp: 180 Tablet, Rfl: 0    Brexpiprazole 0.5 MG Tab, Take 1 Tablet by mouth every evening., Disp: 30 Tablet, Rfl: 3    LORazepam (ATIVAN) 1 MG Tab, Take 0.5 mg by mouth 1 time a day as needed for Anxiety., Disp: , Rfl:     triamcinolone acetonide (KENALOG) 0.1 % Cream, Apply 1 Application topically 2 times a day., Disp: 80 g, Rfl: 1    fluconazole (DIFLUCAN) 150 MG tablet, Take one tablet orally for yeast infection, if symptoms persist, may repeat treatment after 72 hours., Disp: 2 Tablet, Rfl: 0  ALLERGIES:   Allergies   Allergen Reactions    Sulfa Drugs Anaphylaxis     rash     SURGHX:   Past Surgical History:   Procedure Laterality Date    TUBAL COAGULATION LAPAROSCOPIC BILATERAL Bilateral 2019    APPENDECTOMY  2016     SOCHX:  reports that she has quit smoking. Her smoking use included cigarettes. She has never used smokeless tobacco. She reports that  "she does not currently use alcohol. She reports current drug use. Drug: Marijuana.  FH: Reviewed with patient, not pertinent to this visit.       Review of Systems   Musculoskeletal:         Left knee pain        Objective:   /78 (BP Location: Left arm, Patient Position: Sitting, BP Cuff Size: Adult)   Pulse 95   Temp 37 °C (98.6 °F) (Temporal)   Resp 18   Ht 1.6 m (5' 3\")   Wt 98 kg (216 lb)   SpO2 96%   BMI 38.26 kg/m²   Physical Exam  Vitals and nursing note reviewed.   Constitutional:       General: She is not in acute distress.     Appearance: Normal appearance. She is well-developed. She is not ill-appearing or toxic-appearing.   HENT:      Head: Normocephalic and atraumatic.      Right Ear: Hearing normal.      Left Ear: Hearing normal.   Cardiovascular:      Rate and Rhythm: Normal rate.   Pulmonary:      Effort: Pulmonary effort is normal.   Musculoskeletal:      Left knee: No swelling, deformity, effusion, erythema, ecchymosis, bony tenderness or crepitus. Tenderness present over the medial joint line. No patellar tendon tenderness. No LCL laxity or MCL laxity.Normal patellar mobility.      Instability Tests: Anterior drawer test negative. Medial Ryann test negative.        Legs:       Comments: Discomfort with full extension against resistance through the medial aspect of the left knee.   Skin:     General: Skin is warm and dry.   Neurological:      Mental Status: She is alert.      Coordination: Coordination normal.   Psychiatric:         Mood and Affect: Mood normal.         Assessment/Plan:   Assessment    1. Sprain of medial collateral ligament of left knee, initial encounter  - Referral to Orthopedics    Symptoms and presentation appear consistent with sprain of the left knee.  No indication for x-ray on exam today and patient agrees with this.  Recommend back specialist for possible need of further imaging at that time if symptoms persist.  Recommend hinged knee brace.  LARA " TREATMENT FOR EXTREMITY INJURIES:  R-rest the extremity as much as possible while pain and swelling persist  I-ice the extremity 15 minutes every 2 hours for the first 24 hours, then 4-5 times daily   C-compress the extremity either with splint or ace wrap as directed  E-elevate the extremity to help with swelling        Differential diagnosis, natural history, supportive care, and indications for immediate follow-up discussed.   Patient given instructions and understanding of medications and treatment.    If not improving in 3-5 days, F/U with PCP or return to  if symptoms worsen.    Patient agreeable to plan.      Please note that this dictation was created using voice recognition software. I have made every reasonable attempt to correct obvious errors, but I expect that there are errors of grammar and possibly content that I did not discover before finalizing the note.    Tito Brown PA-C

## 2025-01-19 DIAGNOSIS — F41.9 ANXIETY AND DEPRESSION: ICD-10-CM

## 2025-01-19 DIAGNOSIS — F32.A ANXIETY AND DEPRESSION: ICD-10-CM

## 2025-01-20 ENCOUNTER — OFFICE VISIT (OUTPATIENT)
Dept: MEDICAL GROUP | Facility: MEDICAL CENTER | Age: 35
End: 2025-01-20
Attending: FAMILY MEDICINE
Payer: MEDICAID

## 2025-01-20 DIAGNOSIS — E28.2 PCOS (POLYCYSTIC OVARIAN SYNDROME): ICD-10-CM

## 2025-01-20 DIAGNOSIS — N76.0 RECURRENT VAGINITIS: ICD-10-CM

## 2025-01-20 DIAGNOSIS — F43.21 ADJUSTMENT DISORDER WITH DEPRESSED MOOD: ICD-10-CM

## 2025-01-20 DIAGNOSIS — F60.3 BORDERLINE PERSONALITY DISORDER (HCC): ICD-10-CM

## 2025-01-20 DIAGNOSIS — K21.9 GASTROESOPHAGEAL REFLUX DISEASE, UNSPECIFIED WHETHER ESOPHAGITIS PRESENT: ICD-10-CM

## 2025-01-20 DIAGNOSIS — F43.10 PTSD (POST-TRAUMATIC STRESS DISORDER): ICD-10-CM

## 2025-01-20 PROCEDURE — 99213 OFFICE O/P EST LOW 20 MIN: CPT | Performed by: FAMILY MEDICINE

## 2025-01-20 PROCEDURE — 99214 OFFICE O/P EST MOD 30 MIN: CPT | Performed by: FAMILY MEDICINE

## 2025-01-20 RX ORDER — FLUCONAZOLE 150 MG/1
TABLET ORAL
Qty: 2 TABLET | Refills: 0 | Status: SHIPPED | OUTPATIENT
Start: 2025-01-20

## 2025-01-20 RX ORDER — METFORMIN HYDROCHLORIDE 500 MG/1
500 TABLET, EXTENDED RELEASE ORAL DAILY
Qty: 100 TABLET | Refills: 3 | Status: SHIPPED | OUTPATIENT
Start: 2025-01-20 | End: 2026-02-24

## 2025-01-20 RX ORDER — HYDROXYZINE HYDROCHLORIDE 10 MG/1
10 TABLET, FILM COATED ORAL 3 TIMES DAILY PRN
COMMUNITY

## 2025-01-20 RX ORDER — FAMOTIDINE 20 MG/1
20 TABLET, FILM COATED ORAL 2 TIMES DAILY
Qty: 60 TABLET | Refills: 2 | Status: SHIPPED | OUTPATIENT
Start: 2025-01-20

## 2025-01-21 RX ORDER — HYDROXYZINE PAMOATE 25 MG/1
25 CAPSULE ORAL 3 TIMES DAILY PRN
Qty: 90 CAPSULE | Refills: 1 | Status: SHIPPED | OUTPATIENT
Start: 2025-01-21

## 2025-01-21 NOTE — PROGRESS NOTES
Verbal consent was acquired by the patient to use [email or web address] ambient listening note generation during this visit.    Subjective   Chief Complaint   Patient presents with    Gastrophageal Reflux     Wants to discuss OTC and Rx options    Follow-Up     Spironolactone causing yeast infections, PCOS metformin rx, ADHD assessment was positive        HPI:   Ana Maria presents today with    History of Present Illness  The patient presents for evaluation of acid reflux, PCOS, recurrent yeast infections, and cognitive issues. She is accompanied by her partner.    She has been experiencing acid reflux, which she attributes to her high coffee intake. She expresses concern about potential interactions with her current medications and is hesitant to use over-the-counter remedies without professional guidance.    She has expressed interest in initiating metformin therapy for her polycystic ovary syndrome (PCOS), a treatment she has previously undergone. She recalls tolerating the medication well, with no adverse reactions or allergies.    She reports that her current medication, spironolactone, has been causing recurrent yeast infections. Despite this, she wishes to continue the medication due to its overall effectiveness. She is seeking additional treatment options to manage the yeast infections, which she finds distressing. She notes that the infections tend to occur following sexual activity. She has been self-managing the condition with Monistat.    She has been experiencing cognitive difficulties, including forgetfulness, distractibility, and hypersensitivity to noise. These symptoms were first noticed during her online schooling in 2020 and have progressively worsened. A neurologist in [place] suggested that her psychiatric medications might be contributing to these issues. She completed an online assessment through [employer], which yielded a score of 4, indicating severe symptoms. She is currently under the care of   Steve Phillips, who prescribes her medications. She had previously seen a therapist, Raiza Baez, but discontinued the sessions due to inappropriate comments made by the therapist. She has been on a waiting list for a new therapist since then. Her current medications include hydroxyzine, amitriptyline, lorazepam, Latuda, Rexulti, and prazosin.    FAMILY HISTORY  Her father had severe acid reflux, which led to nightly vomiting for years and subsequent checks for cancer.    MEDICATIONS  Current: Spironolactone, hydroxyzine, amitriptyline, lorazepam, Latuda, Rexulti, prazosin      Health Maintenance Due   Topic Date Due    Chickenpox Vaccine (Varicella) (1 of 2 - 13+ 2-dose series) Never done    Hepatitis B Vaccine (Hep B) (3 of 3 - Hep B Twinrix 3-dose series) 08/28/2024       Objective   Social History     Tobacco Use    Smoking status: Former     Current packs/day: 0.00     Types: Cigarettes    Smokeless tobacco: Never    Tobacco comments:     Smoked ~0.5ppd x 6 months   Vaping Use    Vaping status: Some Days    Start date: 1/1/2017    Substances: THC   Substance Use Topics    Alcohol use: Not Currently     Comment: fomer; 3 drinks/week; h/o heavy drinking. Last drink 1/2024    Drug use: Yes     Types: Marijuana     Comment: Use nightly before bed.       Exam:  There were no vitals taken for this visit.    Physical Exam  Constitutional: Alert, no distress  Skin: No rashes in visible areas  Eye: Conjunctiva clear, lids normal  Respiratory: Unlabored respiratory effort, no cough  MSK: Normal gait, moves all extremities  Psych: Alert and oriented x3, normal affect and mood    Allergies   Allergen Reactions    Sulfa Drugs Anaphylaxis     rash       [id number] - [place], [place] - [address] [address] [address] [facility] [address]  Phone: [phone] Fax: [phone]    Current Outpatient Medications   Medication Sig Dispense Refill    hydrOXYzine HCl (ATARAX) 10 MG Tab Take 10 mg by mouth 3  times a day as needed for Itching.      famotidine (PEPCID) 20 MG Tab Take 1 Tablet by mouth 2 times a day. 60 Tablet 2    fluconazole (DIFLUCAN) 150 MG tablet Take one tablet orally for yeast infection, if symptoms persist, may repeat treatment after 72 hours. 2 Tablet 0    metFORMIN ER (GLUCOPHAGE XR) 500 MG TABLET SR 24 HR Take 1 Tablet by mouth every day. 100 Tablet 3    hydrOXYzine pamoate (VISTARIL) 25 MG Cap TAKE 1 CAPSULE BY MOUTH 3 TIMES A DAY AS NEEDED FOR ANXIETY 90 Capsule 1    spironolactone (ALDACTONE) 100 MG Tab TAKE 1 TABLET BY MOUTH DAILY 90 Tablet 2    amitriptyline (ELAVIL) 100 MG Tab TAKE TWO TABLETS BY MOUTH EVERY EVENING 60 Tablet 2    propranolol (INDERAL) 40 MG Tab Take 1 Tablet by mouth 2 times a day. 180 Tablet 2    lurasidone (LATUDA) 80 MG Tab Take 1.5 Tablets by mouth every day with food. 180 Tablet 0    Brexpiprazole 0.5 MG Tab Take 1 Tablet by mouth every evening. 30 Tablet 3    LORazepam (ATIVAN) 1 MG Tab Take 0.5 mg by mouth 1 time a day as needed for Anxiety.       No current facility-administered medications for this visit.       Assessment & Plan    34 y.o. female with the following -   1. Recurrent vaginitis  VAGINAL PATHOGENS DNA PANEL    Referral to Gynecology    fluconazole (DIFLUCAN) 150 MG tablet      2. Gastroesophageal reflux disease, unspecified whether esophagitis present  famotidine (PEPCID) 20 MG Tab      3. PTSD (post-traumatic stress disorder)  Referral to Behavioral Health      4. Borderline personality disorder (HCC)  Referral to Behavioral Health      5. Adjustment disorder with depressed mood  Referral to Behavioral Health      6. PCOS (polycystic ovarian syndrome)  metFORMIN ER (GLUCOPHAGE XR) 500 MG TABLET SR 24 HR        Assessment & Plan  1. Acid reflux - Patient experiences acid reflux, likely triggered by caffeine and other foods.  - Advised to avoid known triggers such as caffeine, chocolate, spicy foods, and acidic foods.  - Stay upright for 30 to 60  minutes after meals and avoid eating at least 3 hours before bedtime.  - Prescription for Pepcid (famotidine) sent to [facility] pharmacy in [facility], to be taken up to twice daily, preferably in the morning or 30 minutes prior to consuming trigger foods.  - If symptoms persist, consider switching to Prilosec (omeprazole).    2. Polycystic ovary syndrome (PCOS) - Patient has a history of PCOS with normal blood work from March 2024.  - Prescription for metformin 500 mg once daily provided.  - Instructed to report any new symptoms or side effects.    3. Recurrent yeast infections - Patient experiences recurrent yeast infections, particularly after sexual activity.  - Advised to maintain sexual hygiene, including rinsing after sexual activity, showering, and avoiding douches or soaps.  - Recommended to download the [employer] susan for additional information on women's gynecological and sexual health.  - Referral to gynecology made for further evaluation.  - Standing order for testing placed; advised to get tested twice.  - Prescription for Diflucan (fluconazole) provided for recurrent yeast infections.    4. Cognitive issues - Patient reports cognitive issues such as forgetfulness and distraction.  - Previous diagnosis of borderline personality disorder (BPD), post-traumatic stress disorder (PTSD), and adjustment disorder with depressed mood.  - Advised to consult with her psychiatrist for further guidance.  - Referral to behavioral health made to reestablish individual counseling or therapy.    Follow-up  - The patient will follow up in 3 to 6 months for her annual visit or sooner if needed.      Return for routine annual wellness exam.    Please be advised that this document was generated using voice recognition software. While I have made reasonable efforts to correct any obvious errors, there may still be grammatical or content inaccuracies that were not identified or corrected prior to finalization.

## 2025-01-21 NOTE — TELEPHONE ENCOUNTER
Received request via: Pharmacy    Was the patient seen in the last year in this department? Yes    Does the patient have an active prescription (recently filled or refills available) for medication(s) requested? No    Pharm[facility] Name: [facility]    Does the patient have [facility] Plus and need 100-day supply? (This applies to ALL medications) Patient does not have SCP

## 2025-02-27 ENCOUNTER — APPOINTMENT (OUTPATIENT)
Dept: OBGYN | Facility: CLINIC | Age: 35
End: 2025-02-27
Attending: FAMILY MEDICINE
Payer: MEDICAID

## 2025-02-28 ENCOUNTER — GYNECOLOGY VISIT (OUTPATIENT)
Dept: OBGYN | Facility: CLINIC | Age: 35
End: 2025-02-28
Attending: FAMILY MEDICINE
Payer: MEDICAID

## 2025-02-28 ENCOUNTER — HOSPITAL ENCOUNTER (OUTPATIENT)
Facility: MEDICAL CENTER | Age: 35
End: 2025-02-28
Attending: PHYSICIAN ASSISTANT
Payer: MEDICAID

## 2025-02-28 VITALS
WEIGHT: 221.2 LBS | SYSTOLIC BLOOD PRESSURE: 110 MMHG | DIASTOLIC BLOOD PRESSURE: 62 MMHG | HEIGHT: 63 IN | BODY MASS INDEX: 39.19 KG/M2

## 2025-02-28 DIAGNOSIS — N89.8 VAGINAL DISCHARGE: ICD-10-CM

## 2025-02-28 LAB — BACTERIA GENITAL QL WET PREP: NEGATIVE

## 2025-02-28 PROCEDURE — 87510 GARDNER VAG DNA DIR PROBE: CPT

## 2025-02-28 PROCEDURE — 87480 CANDIDA DNA DIR PROBE: CPT

## 2025-02-28 PROCEDURE — 87491 CHLMYD TRACH DNA AMP PROBE: CPT

## 2025-02-28 PROCEDURE — 87660 TRICHOMONAS VAGIN DIR PROBE: CPT

## 2025-02-28 PROCEDURE — 87591 N.GONORRHOEAE DNA AMP PROB: CPT

## 2025-02-28 ASSESSMENT — FIBROSIS 4 INDEX: FIB4 SCORE: 0.38

## 2025-02-28 ASSESSMENT — LIFESTYLE VARIABLES: SUBSTANCE_ABUSE: 0

## 2025-02-28 ASSESSMENT — ENCOUNTER SYMPTOMS
FEVER: 0
CHILLS: 0

## 2025-02-28 NOTE — PROGRESS NOTES
Patient here for New patient GYN for recurrent BV   LMP: Pt states does not have periods   Last pap: 2/27/2024 WNL  # [phone]  Pt states has a fishy odor after intercourse.

## 2025-02-28 NOTE — PROGRESS NOTES
"Subjective     Kylah Suresh Long is a 34 y.o. female who presents with Gynecologic Exam (Recurrent vaginitis )  Pt is unsure of LMP as she never has regular periods, which she attributes to her \"tubes being taken out.\" Pt educated that she still has her uterus though pt also notes she has PCOS so hasn't had regular menses for \"many years.\" Pt did have menses when around other females for long periods (partner has 2 daughters) but none since, intermittent spotting prn.      Pt complaints today are for vaginal infections, as pt treated last 1 yr ago for yeast and pt concerned about recurrent infections due to strong odor with intercourse only. Pt denies any vag itching, burning or current odor. Pt occasionally uses condoms, though not regularly. Pt hasn't been treated for BV in past year.   OBHx: Hx 1 TAB without complications  PMHx: 1. Anxiety, depression also PCOS, PTSD and borderline PD - pt sees psychiatrist, who recently put her on Adderall with some side effects.   2. PCOS - f/u with PCP for blood sugar and regular checks  SHX: 1. B salpingectomy 2019 - per pt no complications  2. 2016 - Appendectomy without complications  Allergies: Sulfa causes anaphylaxis  Social: Denies tob, etoh use. Pt does use THC in vape pen. Denies other drug use.   Meds; Taking many psych meds (see list), also metformin 500mg daily, spironolactone and Pepcid, diflucan prn.             HPI    Review of Systems   Constitutional:  Negative for chills and fever.   Psychiatric/Behavioral:  Negative for substance abuse.    All other systems reviewed and are negative.             Objective     /62   Ht 5' 3\"   Wt 221 lb 3.2 oz   BMI 39.18 kg/m²      Physical Exam  Vitals reviewed.   Constitutional:       Appearance: She is well-developed.   HENT:      Head: Normocephalic and atraumatic.   Eyes:      Pupils: Pupils are equal, round, and reactive to light.   Neck:      Thyroid: No thyromegaly.   Cardiovascular:      Rate and Rhythm: " Normal rate and regular rhythm.      Heart sounds: Normal heart sounds.   Pulmonary:      Effort: Pulmonary effort is normal. No respiratory distress.      Breath sounds: Normal breath sounds.   Abdominal:      General: Bowel sounds are normal. There is no distension.      Palpations: Abdomen is soft.      Tenderness: There is no abdominal tenderness.   Genitourinary:     Exam position: Supine.      Labia:         Right: No rash or tenderness.         Left: No rash or tenderness.       Vagina: Normal. No signs of injury and foreign body. No vaginal discharge or erythema.      Cervix: No cervical motion tenderness.      Uterus: Not deviated, not enlarged and not tender.       Adnexa:         Right: No mass or tenderness.          Left: No mass or tenderness.        Comments: Wet mt: Neg  Musculoskeletal:      Cervical back: Normal range of motion and neck supple.   Skin:     General: Skin is warm and dry.      Findings: No erythema.   Neurological:      Mental Status: She is alert.      Deep Tendon Reflexes: Reflexes are normal and symmetric.   Psychiatric:         Behavior: Behavior normal.         Thought Content: Thought content normal.                            Assessment & Plan  Vaginal discharge  - Pt does not meet requirements for chronic BV - pt to try probiotics, minimize use of condoms, sex toys  Pt to call if worsening or needs Vag path testing, though neg today by Wet mt.   - Pt to f/u PCP for PCOS and psych for depression- has appt 1 within next month.   Orders:    VAGINAL PATHOGENS DNA PANEL; Future    Chlamydia/GC, PCR (Genital/Anal swab); Future    POCT Wet Prep

## 2025-03-01 LAB
C TRACH DNA GENITAL QL NAA+PROBE: NEGATIVE
CANDIDA DNA VAG QL PROBE+SIG AMP: NEGATIVE
G VAGINALIS DNA VAG QL PROBE+SIG AMP: POSITIVE
N GONORRHOEA DNA GENITAL QL NAA+PROBE: NEGATIVE
SPECIMEN SOURCE: NORMAL
T VAGINALIS DNA VAG QL PROBE+SIG AMP: NEGATIVE

## 2025-03-05 ENCOUNTER — PATIENT MESSAGE (OUTPATIENT)
Dept: OBGYN | Facility: CLINIC | Age: 35
End: 2025-03-05
Payer: MEDICAID

## 2025-03-07 ENCOUNTER — RESULTS FOLLOW-UP (OUTPATIENT)
Dept: OBGYN | Facility: CLINIC | Age: 35
End: 2025-03-07
Payer: MEDICAID

## 2025-03-07 RX ORDER — METRONIDAZOLE 500 MG/1
500 TABLET ORAL 2 TIMES DAILY
Qty: 14 TABLET | Refills: 0 | Status: SHIPPED | OUTPATIENT
Start: 2025-03-07 | End: 2025-03-14

## 2025-03-19 DIAGNOSIS — F41.9 ANXIETY AND DEPRESSION: ICD-10-CM

## 2025-03-19 DIAGNOSIS — F32.A ANXIETY AND DEPRESSION: ICD-10-CM

## 2025-03-19 RX ORDER — METRONIDAZOLE 7.5 MG/G
1 GEL VAGINAL
Qty: 70 G | Refills: 0 | Status: SHIPPED | OUTPATIENT
Start: 2025-03-19

## 2025-03-19 SDOH — ECONOMIC STABILITY: FOOD INSECURITY: WITHIN THE PAST 12 MONTHS, YOU WORRIED THAT YOUR FOOD WOULD RUN OUT BEFORE YOU GOT MONEY TO BUY MORE.: PATIENT DECLINED

## 2025-03-19 SDOH — HEALTH STABILITY: PHYSICAL HEALTH: ON AVERAGE, HOW MANY MINUTES DO YOU ENGAGE IN EXERCISE AT THIS LEVEL?: 0 MIN

## 2025-03-19 SDOH — ECONOMIC STABILITY: FOOD INSECURITY: WITHIN THE PAST 12 MONTHS, THE FOOD YOU BOUGHT JUST DIDN'T LAST AND YOU DIDN'T HAVE MONEY TO GET MORE.: PATIENT DECLINED

## 2025-03-19 SDOH — ECONOMIC STABILITY: INCOME INSECURITY: IN THE LAST 12 MONTHS, WAS THERE A TIME WHEN YOU WERE NOT ABLE TO PAY THE MORTGAGE OR RENT ON TIME?: NO

## 2025-03-19 SDOH — HEALTH STABILITY: PHYSICAL HEALTH: ON AVERAGE, HOW MANY DAYS PER WEEK DO YOU ENGAGE IN MODERATE TO STRENUOUS EXERCISE (LIKE A BRISK WALK)?: 0 DAYS

## 2025-03-19 SDOH — ECONOMIC STABILITY: INCOME INSECURITY: HOW HARD IS IT FOR YOU TO PAY FOR THE VERY BASICS LIKE FOOD, HOUSING, MEDICAL CARE, AND HEATING?: PATIENT DECLINED

## 2025-03-19 SDOH — HEALTH STABILITY: MENTAL HEALTH
STRESS IS WHEN SOMEONE FEELS TENSE, NERVOUS, ANXIOUS, OR CAN'T SLEEP AT NIGHT BECAUSE THEIR MIND IS TROUBLED. HOW STRESSED ARE YOU?: NOT AT ALL

## 2025-03-19 ASSESSMENT — SOCIAL DETERMINANTS OF HEALTH (SDOH)
HOW OFTEN DO YOU GET TOGETHER WITH FRIENDS OR RELATIVES?: ONCE A WEEK
HOW OFTEN DO YOU ATTEND CHURCH OR RELIGIOUS SERVICES?: NEVER
HOW OFTEN DO YOU GET TOGETHER WITH FRIENDS OR RELATIVES?: ONCE A WEEK
ARE YOU MARRIED, WIDOWED, DIVORCED, SEPARATED, NEVER MARRIED, OR LIVING WITH A PARTNER?: PATIENT DECLINED
IN A TYPICAL WEEK, HOW MANY TIMES DO YOU TALK ON THE PHONE WITH FAMILY, FRIENDS, OR NEIGHBORS?: THREE TIMES A WEEK
HOW HARD IS IT FOR YOU TO PAY FOR THE VERY BASICS LIKE FOOD, HOUSING, MEDICAL CARE, AND HEATING?: PATIENT DECLINED
HOW OFTEN DO YOU HAVE A DRINK CONTAINING ALCOHOL: NEVER
IN THE PAST 12 MONTHS, HAS THE ELECTRIC, GAS, OIL, OR WATER COMPANY THREATENED TO SHUT OFF SERVICE IN YOUR HOME?: NO
WITHIN THE PAST 12 MONTHS, YOU WORRIED THAT YOUR FOOD WOULD RUN OUT BEFORE YOU GOT THE MONEY TO BUY MORE: PATIENT DECLINED
DO YOU BELONG TO ANY CLUBS OR ORGANIZATIONS SUCH AS CHURCH GROUPS UNIONS, FRATERNAL OR ATHLETIC GROUPS, OR SCHOOL GROUPS?: YES
HOW OFTEN DO YOU ATTENT MEETINGS OF THE CLUB OR ORGANIZATION YOU BELONG TO?: NEVER
HOW MANY DRINKS CONTAINING ALCOHOL DO YOU HAVE ON A TYPICAL DAY WHEN YOU ARE DRINKING: PATIENT DOES NOT DRINK
HOW OFTEN DO YOU ATTENT MEETINGS OF THE CLUB OR ORGANIZATION YOU BELONG TO?: NEVER
ARE YOU MARRIED, WIDOWED, DIVORCED, SEPARATED, NEVER MARRIED, OR LIVING WITH A PARTNER?: PATIENT DECLINED
IN A TYPICAL WEEK, HOW MANY TIMES DO YOU TALK ON THE PHONE WITH FAMILY, FRIENDS, OR NEIGHBORS?: THREE TIMES A WEEK
DO YOU BELONG TO ANY CLUBS OR ORGANIZATIONS SUCH AS CHURCH GROUPS UNIONS, FRATERNAL OR ATHLETIC GROUPS, OR SCHOOL GROUPS?: YES
HOW OFTEN DO YOU HAVE SIX OR MORE DRINKS ON ONE OCCASION: NEVER
HOW OFTEN DO YOU ATTEND CHURCH OR RELIGIOUS SERVICES?: NEVER

## 2025-03-19 ASSESSMENT — LIFESTYLE VARIABLES
HOW MANY STANDARD DRINKS CONTAINING ALCOHOL DO YOU HAVE ON A TYPICAL DAY: PATIENT DOES NOT DRINK
HOW OFTEN DO YOU HAVE SIX OR MORE DRINKS ON ONE OCCASION: NEVER
SKIP TO QUESTIONS 9-10: 1
AUDIT-C TOTAL SCORE: 0
HOW OFTEN DO YOU HAVE A DRINK CONTAINING ALCOHOL: NEVER

## 2025-03-20 RX ORDER — HYDROXYZINE PAMOATE 25 MG/1
25 CAPSULE ORAL 3 TIMES DAILY PRN
Qty: 90 CAPSULE | Refills: 1 | Status: SHIPPED | OUTPATIENT
Start: 2025-03-20

## 2025-03-20 NOTE — TELEPHONE ENCOUNTER
Chief Complaint   Patient presents with   • Annual Exam       History of Present Illness  HM, Adult Female: The patient is being seen for a health maintenance and gynecology evaluation. The last health maintenance visit was 1 year(s) ago.   Social History: She is  with 2 children. 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 She reports never drinking alcohol. Denies any illicit drug use.  General Health: The patient's health is described as good. She has regular dental visits. She denies vision problems. She denies hearing loss. Immunizations status: up to date.   Lifestyle:. She consumes a diverse and healthy diet. She does not have any weight concerns. She exercises regularly. She denies tobacco. She denies alcohol use. She denies drug use.   Reproductive health:. She reports normal menses.   Screening: Cancer screening reviewed and current.   Metabolic screening reviewed and current.   Risk screening reviewed and current.     HPI  The patient presents today for a physical.    She thinks she may have strained her wrist while on a trip. If she is holding something, she gets a slight pain in her wrist. It is not continuous pain. If she does heavy work, cleaning, and backyard work, it makes it worse. She has been applying hot oil to her wrist.    Her stomach is doing well.    She has occasional back pain when she is standing for prolonged periods. When she stretches, it feels good.    She is up to date on all of her vaccines. She denies headaches, dizziness, chest pain, shortness of breath, cough, or urination issues. She was having a little pain for the last 2 days. Today, she is feeling good. She denies constipation. Her blood pressure is low today. Her last Pap smear was on 08/18/2020. She denies stress.    In the summer when it is hot, she feels hotness from inside. She drinks a lot of water and something sweet. She tries to avoid going outside.    Review of Systems   Constitutional: Negative for chills and  Received request via: Pharmacy    Was the patient seen in the last year in this department? Yes    Does the patient have an active prescription (recently filled or refills available) for medication(s) requested? No    Pharmacy Name: [facility]    Does the patient have nursing home Plus and need 100-day supply? (This applies to ALL medications) Patient does not have SCP   "fatigue.   HENT: Negative for congestion, ear pain and sore throat.    Eyes: Negative for pain, redness and visual disturbance.   Respiratory: Negative for cough and shortness of breath.    Cardiovascular: Negative for chest pain, palpitations and leg swelling.   Gastrointestinal: Negative for abdominal pain, diarrhea and nausea.   Endocrine: Negative for cold intolerance and heat intolerance.   Genitourinary: Negative for flank pain and urgency.   Musculoskeletal: Negative for arthralgias and gait problem.   Skin: Negative for pallor and rash.   Neurological: Negative for dizziness, weakness and headaches.   Psychiatric/Behavioral: Negative for dysphoric mood and sleep disturbance. The patient is not nervous/anxious.        Patient Active Problem List   Diagnosis   • Epigastric pain   • Previous  delivery affecting pregnancy       Social History     Socioeconomic History   • Marital status:    Tobacco Use   • Smoking status: Never   • Smokeless tobacco: Never   Substance and Sexual Activity   • Alcohol use: No   • Drug use: No   • Sexual activity: Yes     Partners: Female     Birth control/protection: None       Current Outpatient Medications on File Prior to Visit   Medication Sig Dispense Refill   • vitamin D (ERGOCALCIFEROL) 1.25 MG (19996 UT) capsule capsule Take 1 capsule by mouth 1 (One) Time Per Week. 12 capsule 1     No current facility-administered medications on file prior to visit.       No Known Allergies    BP 90/58   Pulse 69   Temp 97.8 °F (36.6 °C)   Ht 160 cm (63\")   Wt 59.4 kg (131 lb)   SpO2 97%   BMI 23.21 kg/m²            The following portions of the patient's history were reviewed and updated as appropriate: allergies, current medications, past family history, past medical history, past social history, past surgical history and problem list.    Physical Exam  Constitutional:       General: She is not in acute distress.     Appearance: Normal appearance.   HENT:      Head: " Normocephalic and atraumatic.      Right Ear: Tympanic membrane and external ear normal.      Left Ear: Tympanic membrane and external ear normal.      Nose: Nose normal.      Mouth/Throat:      Mouth: Mucous membranes are moist.   Eyes:      General: No scleral icterus.  Neck:      Vascular: No carotid bruit.   Cardiovascular:      Rate and Rhythm: Normal rate and regular rhythm.      Pulses: Normal pulses.      Heart sounds: Normal heart sounds. No murmur heard.    No friction rub. No gallop.   Pulmonary:      Effort: Pulmonary effort is normal.      Breath sounds: Normal breath sounds. No rhonchi or rales.   Abdominal:      General: Bowel sounds are normal. There is no distension.      Palpations: Abdomen is soft.      Tenderness: There is no right CVA tenderness, left CVA tenderness, guarding or rebound.      Hernia: No hernia is present.   Musculoskeletal:         General: No tenderness. Normal range of motion.      Cervical back: Normal range of motion.      Right lower leg: No edema.      Left lower leg: No edema.   Lymphadenopathy:      Cervical: No cervical adenopathy.   Skin:     General: Skin is warm.      Findings: No rash.   Neurological:      General: No focal deficit present.      Mental Status: She is alert and oriented to person, place, and time. Mental status is at baseline.      Cranial Nerves: No cranial nerve deficit.      Sensory: No sensory deficit.      Coordination: Coordination normal.      Gait: Gait normal.      Deep Tendon Reflexes: Reflexes normal.   Psychiatric:         Mood and Affect: Mood normal.         Behavior: Behavior normal.         Results for orders placed or performed in visit on 05/08/23   POCT urinalysis dipstick, automated    Specimen: Urine   Result Value Ref Range    Color Yellow Yellow, Straw, Dark Yellow, Karen    Clarity, UA Clear Clear    Specific Gravity  1.010 1.005 - 1.030    pH, Urine 6.0 5.0 - 8.0    Leukocytes Negative Negative    Nitrite, UA Negative  Negative    Protein, POC Negative Negative mg/dL    Glucose, UA Negative Negative mg/dL    Ketones, UA Negative Negative    Urobilinogen, UA Normal Normal, 0.2 E.U./dL    Bilirubin Negative Negative    Blood, UA Negative Negative    Lot Number [id number]     Expiration Date 7/13/24        Diagnoses and all orders for this visit:    1. Routine physical examination (Primary)  -     Diclofenac Sodium (VOLTAREN) 1 % gel gel; Apply 4 g topically to the appropriate area as directed 3 (Three) Times a Day.  -     CBC (No Diff); Future  -     Comprehensive Metabolic Panel; Future  -     Hemoglobin A1c; Future  -     Lipid Panel; Future  -     TSH Rfx On Abnormal To Free T4; Future  -     Vitamin B12; Future  -     POCT urinalysis dipstick, automated    2. Right hand tendonitis  Comments:  voltaren gel topical    3. Chronic bilateral low back pain without sciatica  Comments:  continue stretches. trish if recurs, will send in flexeril.    4. Vitamin D deficiency  -     Vitamin D,25-Hydroxy; Future          Health Maintenance   Topic Date Due   • INFLUENZA VACCINE  08/01/2023   • PAP SMEAR  08/18/2023   • ANNUAL PHYSICAL  05/08/2024   • TDAP/TD VACCINES (3 - Td or Tdap) 01/10/2030   • HEPATITIS C SCREENING  Completed   • COVID-19 Vaccine  Completed   • Pneumococcal Vaccine 0-64  Aged Out       Discussion/Summary  Impression: health maintenance visit. Currently, she eats an adequate diet and has an adequate exercise regimen.   Cervical cancer screening:Pap smear is current.   Breast cancer screening: mammogram is due at 45.   Colorectal cancer screening: colonoscopy is due at 45  Osteoporosis screening: Bone mineral density test is .   CT low dose screen - not indicated  Screening lab work includes hemoglobin, glucose, lipid profile, thyroid function testing, 25-hydroxyvitamin D and urinalysis.   Immunizations are utd.  Advice and education were given regarding cardiovascular risk reduction, healthy dietary habits, Seatbelt  and helmet use and self skin examination.     Return in about 1 year (around 5/8/2024) for Annual.      Electronically signed by:    Tamara Martinez MD Transcribed from ambient dictation for Tamara Martinez MD by Afshan Bowers.  05/08/23   15:35 EDT    Patient or patient representative verbalized consent to the visit recording.  I have personally performed the services described in this document as transcribed by the above individual, and it is both accurate and complete.  Tamara Martinez MD  5/9/2023  23:48 EDT

## 2025-03-26 ENCOUNTER — OFFICE VISIT (OUTPATIENT)
Dept: MEDICAL GROUP | Facility: MEDICAL CENTER | Age: 35
End: 2025-03-26
Attending: FAMILY MEDICINE
Payer: MEDICAID

## 2025-03-26 VITALS
HEART RATE: 80 BPM | RESPIRATION RATE: 16 BRPM | WEIGHT: 219 LBS | BODY MASS INDEX: 38.8 KG/M2 | OXYGEN SATURATION: 97 % | SYSTOLIC BLOOD PRESSURE: 112 MMHG | HEIGHT: 63 IN | DIASTOLIC BLOOD PRESSURE: 68 MMHG | TEMPERATURE: 97.1 F

## 2025-03-26 DIAGNOSIS — Z13.29 SCREENING FOR ENDOCRINE, NUTRITIONAL, METABOLIC AND IMMUNITY DISORDER: ICD-10-CM

## 2025-03-26 DIAGNOSIS — Z13.21 SCREENING FOR ENDOCRINE, NUTRITIONAL, METABOLIC AND IMMUNITY DISORDER: ICD-10-CM

## 2025-03-26 DIAGNOSIS — Z13.228 SCREENING FOR ENDOCRINE, NUTRITIONAL, METABOLIC AND IMMUNITY DISORDER: ICD-10-CM

## 2025-03-26 DIAGNOSIS — Z00.00 ROUTINE GENERAL MEDICAL EXAMINATION AT A HEALTH CARE FACILITY: ICD-10-CM

## 2025-03-26 DIAGNOSIS — Z13.0 SCREENING FOR ENDOCRINE, NUTRITIONAL, METABOLIC AND IMMUNITY DISORDER: ICD-10-CM

## 2025-03-26 PROCEDURE — 99395 PREV VISIT EST AGE 18-39: CPT | Mod: EP | Performed by: FAMILY MEDICINE

## 2025-03-26 PROCEDURE — G0438 PPPS, INITIAL VISIT: HCPCS | Performed by: FAMILY MEDICINE

## 2025-03-26 PROCEDURE — 3074F SYST BP LT 130 MM HG: CPT | Performed by: FAMILY MEDICINE

## 2025-03-26 PROCEDURE — 3078F DIAST BP <80 MM HG: CPT | Performed by: FAMILY MEDICINE

## 2025-03-26 ASSESSMENT — FIBROSIS 4 INDEX: FIB4 SCORE: 0.38

## 2025-03-26 NOTE — PROGRESS NOTES
"Subjective:     CC:   Chief Complaint   Patient presents with    Annual Exam     HPI:   Ana Maria Long is a 33 y.o. female who presents for annual exam with her partner Isidoro. Patient is feeling well and denies any acute complaints.    Ob-Gyn/ History:    Patient has GYN provider: no  /Para:    Last Pap Smear: 2024. Repeat due . One history of abnormal pap smear, repeat normal  Gyn Surgery:  BTL 2019.  Current Contraceptive Method: Surgical. She is currently sexually active.  Last menstrual period: 24.  Periods irregular since BTL. moderate bleeding. Cramping is no.   Patient does take OTC analgesics for cramps.  No significant bloating/fluid retention, pelvic pain, or dyspareunia. No abnormal vaginal discharge; currently under treatment for BV.  Folate intake: no  No plans for pregnancy within the next year    Health Maintenance  Cholesterol Screening:   Lab Results   Component Value Date/Time    CHOLSTRLTOT 191 2024 11:25 AM     (H) 2024 11:25 AM    HDL 56 2024 11:25 AM    TRIGLYCERIDE 50 2024 11:25 AM       Lab Results   Component Value Date/Time    SODIUM 142 2024 11:25 AM    POTASSIUM 4.7 2024 11:25 AM    CHLORIDE 106 2024 11:25 AM    CO2 25 2024 11:25 AM    GLUCOSE 85 2024 11:25 AM    BUN 12 2024 11:25 AM    CREATININE 0.76 2024 11:25 AM     Lab Results   Component Value Date/Time    ALKPHOSPHAT 83 2024 11:25 AM    ASTSGOT 16 2024 11:25 AM    ALTSGPT 15 2024 11:25 AM    TBILIRUBIN 0.2 2024 11:25 AM        Diabetes Screening:   Lab Results   Component Value Date/Time    HBA1C 4.9 2024 11:25 AM       Anticipatory Guidance  Diet: Admits to eating a lot of \"junk food\"/poor diet. Limited fruit and vegetable intake.  Exercise: Starting gym membership with Isidoro.  Sleep: Denies issues with falling or staying asleep; averages 8-9 hours; 6-11 hours  Safe in relationship.   Seat belts, " helmet, gun, sun safety discussed.    Cancer screening  Cervical Cancer Screening: Performed 2/27/24     Infectious disease screening/Immunizations  --STI Screening: up to date  -- Multiple partners in the past; has STD testing regularly. Had male and female partners. Currently monogamous with Isidoro.  --HIV Screening: done 3/2024; results normal  --Hepatitis C Screening:done 3/2024; results normal  --Immunizations:    Influenza: due yearly   HPV: not applicable    Tetanus: up to date; repeat 2/2034   Shingles: n/a    Pneumococcal : n/a     Hepatitis B: due for 3rd dose  COVID-19: up to date     She  has a past medical history of Allergy, Anxiety, Depression, GERD (gastroesophageal reflux disease), Head ache, and Migraine.  She  has a past surgical history that includes tubal coagulation laparoscopic bilateral (Bilateral, 2019) and appendectomy (2016).    Family History   Problem Relation Age of Onset    Heart Disease Mother     Diabetes Mother     Hypertension Mother     Depression Mother     Psychiatric Illness Mother         depression    Hyperlipidemia Mother     Lung Disease Father         possible lung cancer    Heart Disease Father     Diabetes Father     Psychiatric Illness Sister     Psychiatric Illness Brother         PTSD, depression    Alcohol abuse Brother     Diabetes Maternal Grandmother     Alcohol abuse Maternal Grandmother     Alcohol abuse Maternal Grandfather     Diabetes Paternal Grandmother     Alcohol abuse Paternal Grandmother     Alcohol abuse Paternal Grandfather     Psychiatric Illness Sister         Bipolar, depression    Drug abuse Sister     Psychiatric Illness Sister         depression       Social History     Socioeconomic History    Marital status: Legally      Spouse name: Not on file    Number of children: 0    Years of education: Not on file    Highest education level: Associate degree: academic program   Occupational History     Comment: Unemployed   Tobacco Use     Smoking status: Former     Current packs/day: 0.00     Types: Cigarettes     Quit date: 2020     Years since quittin.2    Smokeless tobacco: Never    Tobacco comments:     Vaped and smokes cigarettes.   Vaping Use    Vaping status: Some Days    Start date: 2017    Substances: THC   Substance and Sexual Activity    Alcohol use: Not Currently     Comment: Quit drinking 24    Drug use: Yes     Frequency: 7.0 times per week     Types: Marijuana     Comment: Use nightly before bed.    Sexual activity: Yes     Partners: Female, Male     Birth control/protection: Condom, Surgical, Condom Male, Female Sterilization   Other Topics Concern    Not on file   Social History Narrative    Lives with partner Isidoro, and his 2 daughters (17 and 13)     Social Drivers of Health     Financial Resource Strain: Patient Declined (3/19/2025)    Overall Financial Resource Strain (CARDIA)     Difficulty of Paying Living Expenses: Patient declined   Food Insecurity: Patient Declined (3/19/2025)    Hunger Vital Sign     Worried About Running Out of Food in the Last Year: Patient declined     Ran Out of Food in the Last Year: Patient declined   Transportation Needs: No Transportation Needs (3/19/2025)    PRAPARE - Transportation     Lack of Transportation (Medical): No     Lack of Transportation (Non-Medical): No   Physical Activity: Inactive (3/19/2025)    Exercise Vital Sign     Days of Exercise per Week: 0 days     Minutes of Exercise per Session: 0 min   Stress: No Stress Concern Present (3/19/2025)    [employer] - Occupational Stress Questionnaire     Feeling of Stress : Not at all   Social Connections: Unknown (3/19/2025)    Social Connection and Isolation Panel [NHANES]     Frequency of Communication with Friends and Family: Three times a week     Frequency of Social Gatherings with Friends and Family: Once a week     Attends Synagogue Services: Never     Active Member of Clubs or  Organizations: Yes     Attends Club or Organization Meetings: Never     Marital Status: Patient declined   Intimate Partner Violence: Not on file   Housing Stability: Low Risk  (3/19/2025)    Housing Stability Vital Sign     Unable to Pay for Housing in the Last Year: No     Number of Times Moved in the Last Year: 0     Homeless in the Last Year: No       Patient Active Problem List    Diagnosis Date Noted    Borderline personality disorder (HCC) 11/03/2023    PTSD (post-traumatic stress disorder) 11/03/2023    Anxiety and depression 11/03/2023    PCOS (polycystic ovarian syndrome) 11/03/2023    Adjustment disorder with depressed mood 04/10/2008    Dysmenorrhea 02/13/2008         Current Outpatient Medications   Medication Sig Dispense Refill    hydrOXYzine pamoate (VISTARIL) 25 MG Cap TAKE 1 CAPSULE BY MOUTH 3 TIMES A DAY AS NEEDED FOR ANXIETY 90 Capsule 1    metroNIDAZOLE (METROGEL-VAGINAL) 0.75 % Gel Insert 1 Applicator into the vagina at bedtime. 70 g 0    hydrOXYzine HCl (ATARAX) 10 MG Tab Take 10 mg by mouth 3 times a day as needed for Itching.      famotidine (PEPCID) 20 MG Tab Take 1 Tablet by mouth 2 times a day. 60 Tablet 2    metFORMIN ER (GLUCOPHAGE XR) 500 MG TABLET SR 24 HR Take 1 Tablet by mouth every day. 100 Tablet 3    spironolactone (ALDACTONE) 100 MG Tab TAKE 1 TABLET BY MOUTH DAILY 90 Tablet 2    amitriptyline (ELAVIL) 100 MG Tab TAKE TWO TABLETS BY MOUTH EVERY EVENING 60 Tablet 2    propranolol (INDERAL) 40 MG Tab Take 1 Tablet by mouth 2 times a day. 180 Tablet 2    lurasidone (LATUDA) 80 MG Tab Take 1.5 Tablets by mouth every day with food. 180 Tablet 0    Brexpiprazole 0.5 MG Tab Take 1 Tablet by mouth every evening. 30 Tablet 3    LORazepam (ATIVAN) 1 MG Tab Take 0.5 mg by mouth 1 time a day as needed for Anxiety.       No current facility-administered medications for this visit.     Allergies   Allergen Reactions    Sulfa Drugs Anaphylaxis     rash     Review of Systems   Constitutional:  "Negative for fever, chills and malaise/fatigue.   HENT: Negative for congestion.    Eyes: Negative for pain.    Respiratory: Negative for cough and shortness of breath.  Cardiovascular: Negative for leg swelling.   Gastrointestinal: Negative for nausea, vomiting, abdominal pain and diarrhea.   Genitourinary: Negative for dysuria and hematuria.   Skin: Negative for rash.   Neurological: Negative for dizziness, focal weakness and headaches.   Endo/Heme/Allergies: Does not bleed easily.   Psychiatric/Behavioral: Negative for depression.  The patient is not nervous/anxious.      Objective:     /68   Pulse 80   Temp 36.2 °C (97.1 °F)   Resp 16   Ht 1.6 m (5' 2.99\")   Wt 99.3 kg (219 lb)   SpO2 97%   BMI 38.80 kg/m²   Body mass index is 38.8 kg/m².  Wt Readings from Last 4 Encounters:   03/26/25 99.3 kg (219 lb)   02/28/25 100 kg (221 lb 3.2 oz)   12/15/24 98 kg (216 lb)   02/27/24 92.4 kg (203 lb 12.8 oz)       Physical Exam:  Constitutional: Well-developed and well-nourished. Not diaphoretic. No distress.   Skin: Skin is warm and dry. Hyperpigmented scaly rash in bilateral axilla  Head: Atraumatic without lesions.  Eyes: Conjunctivae and extraocular motions are normal. Pupils are equal, round, and reactive to light. No scleral icterus.   Ears:  External ears unremarkable. Tympanic membranes clear and intact.  Nose: Nares patent.  No discharge.   Mouth/Throat: Dentition is normal. Tongue normal. Oropharynx is clear and moist. Posterior pharynx without erythema or exudates.  Neck: Supple, trachea midline. Normal range of motion. No thyromegaly present. No lymphadenopathy--cervical or supraclavicular.  Cardiovascular: Regular rate and rhythm, no murmur, rubs, or gallops.  Lungs: Normal inspiratory effort on room air, CTA bilaterally, no wheezes/rhonchi/rales  Breast: Breasts examined seated and supine. No skin changes, peau d'orange or nipple retraction. No discharge. No axillary or supraclavicular adenopathy. " No masses or nodularity palpable.   Abdomen: Soft, non tender, and without distention. Active bowel sounds in all four quadrants. No rebound, guarding, masses or HSM.  :Perineum and external genitalia normal without rash. Vagina with scant white discharge. Cervix without visible lesions or discharge. Bimanual exam without adnexal masses or cervical motion tenderness.  Extremities: No cyanosis, clubbing, erythema, nor edema. Distal pulses intact and symmetric.   Musculoskeletal: All major joints AROM full in all directions without pain.  Neurological: Alert and oriented x 3. DTRs 2+/3 and symmetric. No cranial nerve deficit. 5/5 myotomes. Sensation intact.   Psychiatric:  Behavior, mood, and affect are appropriate.    A chaperone was offered to the patient during today's exam. Patient declined chaperone.    Assessment and Plan:     1. Routine general medical examination at a health care facility  HIV AG/AB Combo Assay Screening    T.Pallidum AB MICHELLE (Screening)    Trichomonas Vaginalis by TMA    Hepatitis C Virus Antibody    HEP B Surface Antibody    Hep B Core AB Total    Hep B Surface Antigen    Chlamydia/GC, PCR (Urine)      2. Screening for endocrine, nutritional, metabolic and immunity disorder  Lipid Profile    Comp Metabolic Panel    CBC WITH DIFFERENTIAL          1. Routine annual exam  Preventative Medicine / HCM visit with no emergent concerns.  - Recommended yearly HCM visits  - Discussed importance of healthy diet and exercise (5x/week, 20-30 minutes of sustained cardiovascular training)  -Advised that pt should take 400 - 800 mcg of folic acid per day.  -Advised on maintaining routine vaccinations  -Will contact with abnormal lab results.  -Advised pt to wear helmets. seatbelt and sunscreen as discussed; continue safer sex practices  - Anticipatory guidance including:  Exercise:   - Try for at least 150 minutes of cardiovascular (strenuous) exercise per week.   Safety:   - Wear your seat belt at all  "times when in a car and NO TEXTING/CELL PHONES while driving.   - Wear a helmet while biking, using a motorcycle, skiing/snow boarding, skate/long boarding, or any activities faster than running.   - Avoid smoking.   - If you have a gun it needs to be locked up and stored unloaded away from children.   Nutrition:   - Drink enough water so that urine is light yellow/clear  - Try to avoid alcoholic drinks; or consume no more than 2 alcoholic drinks per day for men. No more than 1 alcoholic drink per day for woman.   - Read labels for calories   - Use website \"My Fitness Pal\" for free calorie counting tool when possible.   Stress:   - Make time for activities that allow you to decrease stress and recognize any red flags of stress for you to know when to activate your stress release mechanisms.   Sleep:   - Try to get 7-9 hours of sleep each night.   Preventative Care:   - Follow-up yearly for your annual exams   - Pap smears start at age 21 repeat every 3-5 years depending on age and results until age 64  - Colon cancer screening starting at age 45 until age 75  - Mammograms for breast cancer screening every 1-2 years can start at age 40; general recommendation is biennial screening mammography for women aged 50 to 74 years  - Annual flu vaccination   - Bone density screening at age 65  - Pneumonia vaccination at age 50  - Shingles vaccination at age 50  - VAGINAL PATHOGENS DNA PANEL    Follow-up: No follow-ups on file.    Verbal consent was acquired by the patient to use [email or web address] ambient listening note generation during this visit.    Please be advised that this document was generated using voice recognition software. While I have made reasonable efforts to correct any obvious errors, there may still be grammatical or content inaccuracies that were not identified or corrected prior to finalization.      "

## 2025-04-09 ENCOUNTER — HOSPITAL ENCOUNTER (OUTPATIENT)
Dept: LAB | Facility: MEDICAL CENTER | Age: 35
End: 2025-04-09
Attending: FAMILY MEDICINE
Payer: MEDICAID

## 2025-04-09 DIAGNOSIS — Z13.29 SCREENING FOR ENDOCRINE, NUTRITIONAL, METABOLIC AND IMMUNITY DISORDER: ICD-10-CM

## 2025-04-09 DIAGNOSIS — Z00.00 ROUTINE GENERAL MEDICAL EXAMINATION AT A HEALTH CARE FACILITY: ICD-10-CM

## 2025-04-09 DIAGNOSIS — Z13.21 SCREENING FOR ENDOCRINE, NUTRITIONAL, METABOLIC AND IMMUNITY DISORDER: ICD-10-CM

## 2025-04-09 DIAGNOSIS — Z13.0 SCREENING FOR ENDOCRINE, NUTRITIONAL, METABOLIC AND IMMUNITY DISORDER: ICD-10-CM

## 2025-04-09 DIAGNOSIS — Z13.228 SCREENING FOR ENDOCRINE, NUTRITIONAL, METABOLIC AND IMMUNITY DISORDER: ICD-10-CM

## 2025-04-09 LAB
ALBUMIN SERPL BCP-MCNC: 4.5 G/DL (ref 3.2–4.9)
ALBUMIN/GLOB SERPL: 1.4 G/DL
ALP SERPL-CCNC: 98 U/L (ref 30–99)
ALT SERPL-CCNC: 33 U/L (ref 2–50)
ANION GAP SERPL CALC-SCNC: 11 MMOL/L (ref 7–16)
AST SERPL-CCNC: 50 U/L (ref 12–45)
BASOPHILS # BLD AUTO: 0.7 % (ref 0–1.8)
BASOPHILS # BLD: 0.06 K/UL (ref 0–0.12)
BILIRUB SERPL-MCNC: 0.3 MG/DL (ref 0.1–1.5)
BUN SERPL-MCNC: 12 MG/DL (ref 8–22)
CALCIUM ALBUM COR SERPL-MCNC: 9.6 MG/DL (ref 8.5–10.5)
CALCIUM SERPL-MCNC: 10 MG/DL (ref 8.5–10.5)
CHLORIDE SERPL-SCNC: 105 MMOL/L (ref 96–112)
CHOLEST SERPL-MCNC: 224 MG/DL (ref 100–199)
CO2 SERPL-SCNC: 24 MMOL/L (ref 20–33)
CREAT SERPL-MCNC: 0.86 MG/DL (ref 0.5–1.4)
EOSINOPHIL # BLD AUTO: 0.16 K/UL (ref 0–0.51)
EOSINOPHIL NFR BLD: 1.9 % (ref 0–6.9)
ERYTHROCYTE [DISTWIDTH] IN BLOOD BY AUTOMATED COUNT: 43 FL (ref 35.9–50)
GFR SERPLBLD CREATININE-BSD FMLA CKD-EPI: 91 ML/MIN/1.73 M 2
GLOBULIN SER CALC-MCNC: 3.2 G/DL (ref 1.9–3.5)
GLUCOSE SERPL-MCNC: 98 MG/DL (ref 65–99)
HBV CORE AB SERPL QL IA: NONREACTIVE
HBV SURFACE AB SERPL IA-ACNC: ABNORMAL MIU/ML (ref 0–10)
HBV SURFACE AG SER QL: NORMAL
HCT VFR BLD AUTO: 43.8 % (ref 37–47)
HCV AB SER QL: NORMAL
HDLC SERPL-MCNC: 54 MG/DL
HGB BLD-MCNC: 14.8 G/DL (ref 12–16)
HIV 1+2 AB+HIV1 P24 AG SERPL QL IA: NORMAL
IMM GRANULOCYTES # BLD AUTO: 0.03 K/UL (ref 0–0.11)
IMM GRANULOCYTES NFR BLD AUTO: 0.4 % (ref 0–0.9)
LDLC SERPL CALC-MCNC: 151 MG/DL
LYMPHOCYTES # BLD AUTO: 1.71 K/UL (ref 1–4.8)
LYMPHOCYTES NFR BLD: 20.2 % (ref 22–41)
MCH RBC QN AUTO: 31 PG (ref 27–33)
MCHC RBC AUTO-ENTMCNC: 33.8 G/DL (ref 32.2–35.5)
MCV RBC AUTO: 91.8 FL (ref 81.4–97.8)
MONOCYTES # BLD AUTO: 0.53 K/UL (ref 0–0.85)
MONOCYTES NFR BLD AUTO: 6.3 % (ref 0–13.4)
NEUTROPHILS # BLD AUTO: 5.97 K/UL (ref 1.82–7.42)
NEUTROPHILS NFR BLD: 70.5 % (ref 44–72)
NRBC # BLD AUTO: 0 K/UL
NRBC BLD-RTO: 0 /100 WBC (ref 0–0.2)
PLATELET # BLD AUTO: 340 K/UL (ref 164–446)
PMV BLD AUTO: 9 FL (ref 9–12.9)
POTASSIUM SERPL-SCNC: 4.3 MMOL/L (ref 3.6–5.5)
PROT SERPL-MCNC: 7.7 G/DL (ref 6–8.2)
RBC # BLD AUTO: 4.77 M/UL (ref 4.2–5.4)
SODIUM SERPL-SCNC: 140 MMOL/L (ref 135–145)
T PALLIDUM AB SER QL IA: NORMAL
TRIGL SERPL-MCNC: 93 MG/DL (ref 0–149)
WBC # BLD AUTO: 8.5 K/UL (ref 4.8–10.8)

## 2025-04-09 PROCEDURE — 80061 LIPID PANEL: CPT

## 2025-04-09 PROCEDURE — 85025 COMPLETE CBC W/AUTO DIFF WBC: CPT

## 2025-04-09 PROCEDURE — 87591 N.GONORRHOEAE DNA AMP PROB: CPT

## 2025-04-09 PROCEDURE — 86803 HEPATITIS C AB TEST: CPT

## 2025-04-09 PROCEDURE — 86706 HEP B SURFACE ANTIBODY: CPT

## 2025-04-09 PROCEDURE — 87491 CHLMYD TRACH DNA AMP PROBE: CPT

## 2025-04-09 PROCEDURE — 87389 HIV-1 AG W/HIV-1&-2 AB AG IA: CPT

## 2025-04-09 PROCEDURE — 86704 HEP B CORE ANTIBODY TOTAL: CPT

## 2025-04-09 PROCEDURE — 36415 COLL VENOUS BLD VENIPUNCTURE: CPT

## 2025-04-09 PROCEDURE — 87340 HEPATITIS B SURFACE AG IA: CPT

## 2025-04-09 PROCEDURE — 80053 COMPREHEN METABOLIC PANEL: CPT

## 2025-04-09 PROCEDURE — 86780 TREPONEMA PALLIDUM: CPT

## 2025-04-10 LAB
C TRACH DNA SPEC QL NAA+PROBE: NEGATIVE
N GONORRHOEA DNA SPEC QL NAA+PROBE: NEGATIVE
SPECIMEN SOURCE: NORMAL

## 2025-04-14 ENCOUNTER — RESULTS FOLLOW-UP (OUTPATIENT)
Dept: MEDICAL GROUP | Facility: MEDICAL CENTER | Age: 35
End: 2025-04-14
Payer: MEDICAID

## 2025-04-16 DIAGNOSIS — K21.9 GASTROESOPHAGEAL REFLUX DISEASE, UNSPECIFIED WHETHER ESOPHAGITIS PRESENT: ICD-10-CM

## 2025-04-17 RX ORDER — FAMOTIDINE 20 MG/1
20 TABLET, FILM COATED ORAL 2 TIMES DAILY
Qty: 60 TABLET | Refills: 2 | Status: SHIPPED | OUTPATIENT
Start: 2025-04-17

## 2025-04-17 NOTE — TELEPHONE ENCOUNTER
Received request via: Pharmacy    Was the patient seen in the last year in this department? Yes    Does the patient have an active prescription (recently filled or refills available) for medication(s) requested? No    Pharmacy Name: [facility] [place]    Does the patient have longterm Plus and need 100-day supply? (This applies to ALL medications) Patient does not have SCP

## 2025-05-17 DIAGNOSIS — F41.9 ANXIETY AND DEPRESSION: ICD-10-CM

## 2025-05-17 DIAGNOSIS — F32.A ANXIETY AND DEPRESSION: ICD-10-CM

## 2025-05-19 RX ORDER — PROPRANOLOL HYDROCHLORIDE 40 MG/1
40 TABLET ORAL 2 TIMES DAILY
Qty: 180 TABLET | Refills: 2 | Status: SHIPPED | OUTPATIENT
Start: 2025-05-19

## 2025-05-19 NOTE — TELEPHONE ENCOUNTER
Received request via: Pharmacy    Was the patient seen in the last year in this department? Yes    Does the patient have an active prescription (recently filled or refills available) for medication(s) requested? No    Pharmacy Name: [facility]    Does the patient have [facility] Plus and need 100-day supply? (This applies to ALL medications) Patient does not have SCP

## 2025-05-22 ENCOUNTER — PATIENT MESSAGE (OUTPATIENT)
Dept: MEDICAL GROUP | Facility: MEDICAL CENTER | Age: 35
End: 2025-05-22
Payer: MEDICAID

## 2025-05-22 DIAGNOSIS — F41.9 ANXIETY AND DEPRESSION: ICD-10-CM

## 2025-05-22 DIAGNOSIS — F32.A ANXIETY AND DEPRESSION: ICD-10-CM

## 2025-05-22 DIAGNOSIS — J30.89 ENVIRONMENTAL AND SEASONAL ALLERGIES: Primary | ICD-10-CM

## 2025-05-23 NOTE — TELEPHONE ENCOUNTER
Received request via: Pharmacy    Was the patient seen in the last year in this department? Yes    Does the patient have an active prescription (recently filled or refills available) for medication(s) requested? No    Pharmacy Name: [facility]    Does the patient have assisted Plus and need 100-day supply? (This applies to ALL medications) Patient does not have SCP

## 2025-05-27 RX ORDER — FEXOFENADINE HCL 60 MG/1
60 TABLET, FILM COATED ORAL DAILY
Qty: 30 TABLET | Refills: 11 | Status: SHIPPED | OUTPATIENT
Start: 2025-05-27

## 2025-05-27 RX ORDER — HYDROXYZINE PAMOATE 25 MG/1
25 CAPSULE ORAL 3 TIMES DAILY PRN
Qty: 90 CAPSULE | Refills: 1 | Status: SHIPPED | OUTPATIENT
Start: 2025-05-27

## 2025-06-04 ENCOUNTER — PATIENT MESSAGE (OUTPATIENT)
Dept: MEDICAL GROUP | Facility: MEDICAL CENTER | Age: 35
End: 2025-06-04
Payer: MEDICAID

## 2025-06-04 DIAGNOSIS — R21 RASH AND NONSPECIFIC SKIN ERUPTION: Primary | ICD-10-CM

## 2025-06-05 RX ORDER — TRIAMCINOLONE ACETONIDE 1 MG/G
1 CREAM TOPICAL 2 TIMES DAILY
Qty: 45 G | Refills: 2 | Status: SHIPPED | OUTPATIENT
Start: 2025-06-05

## 2025-07-02 ENCOUNTER — PATIENT MESSAGE (OUTPATIENT)
Dept: MEDICAL GROUP | Facility: MEDICAL CENTER | Age: 35
End: 2025-07-02
Payer: MEDICAID

## 2025-07-02 DIAGNOSIS — Z01.89 ENCOUNTER FOR NEUROPSYCHOLOGICAL TESTING: Primary | ICD-10-CM

## 2025-07-10 NOTE — Clinical Note
REFERRAL APPROVAL NOTICE         Sent on July 10, 2025                   Kylah Long  [address] [facility] [place] [address]                   Dear Ms. Long,    After a careful review of the medical information and benefit coverage, [facility] has processed your referral. See below for additional details.    If applicable, you must be actively enrolled with your insurance for coverage of the authorized service. If you have any questions regarding your coverage, please contact your insurance directly.    REFERRAL INFORMATION   Referral #:  [id number]  Referred-To Provider    Referred-By Provider:  Behavioral Health    Tiny Gunn M.D.   [facility]      [address] [address]  [phone] [address] [facility] [address]  [phone]    Referral Start Date:  07/03/2025  Referral End Date:   07/03/2026             SCHEDULING  If you do not already have an appointment, please call [phone] to make an appointment.     MORE INFORMATION  If you do not already have a [employer] account, sign up at: [email or web address]  You can access your medical information, make appointments, see lab results, billing information, and more.  If you have questions regarding this referral, please contact  the [facility] Referrals department at:             [phone]. Monday - Friday 8:00AM - 5:00PM.     Sincerely,    [facility]

## 2025-07-18 DIAGNOSIS — K21.9 GASTROESOPHAGEAL REFLUX DISEASE, UNSPECIFIED WHETHER ESOPHAGITIS PRESENT: ICD-10-CM

## 2025-07-18 RX ORDER — FAMOTIDINE 20 MG/1
20 TABLET, FILM COATED ORAL 2 TIMES DAILY
Qty: 60 TABLET | Refills: 5 | Status: SHIPPED | OUTPATIENT
Start: 2025-07-18

## 2025-07-18 NOTE — TELEPHONE ENCOUNTER
Received request via: Patient    Was the patient seen in the last year in this department? Yes    Does the patient have an active prescription (recently filled or refills available) for medication(s) requested? No    Pharmacy Name: [facility]    Does the patient have group home Plus and need 100-day supply? (This applies to ALL medications) Patient does not have SCP

## 2025-08-03 DIAGNOSIS — F32.A ANXIETY AND DEPRESSION: ICD-10-CM

## 2025-08-03 DIAGNOSIS — F41.9 ANXIETY AND DEPRESSION: ICD-10-CM

## 2025-08-04 RX ORDER — HYDROXYZINE PAMOATE 25 MG/1
25 CAPSULE ORAL 3 TIMES DAILY PRN
Qty: 90 CAPSULE | Refills: 1 | Status: SHIPPED | OUTPATIENT
Start: 2025-08-04